# Patient Record
Sex: FEMALE | Race: WHITE
[De-identification: names, ages, dates, MRNs, and addresses within clinical notes are randomized per-mention and may not be internally consistent; named-entity substitution may affect disease eponyms.]

---

## 2017-01-09 ENCOUNTER — APPOINTMENT (OUTPATIENT)
Dept: FAMILY MEDICINE | Facility: CLINIC | Age: 52
End: 2017-01-09

## 2017-01-09 VITALS
TEMPERATURE: 97.7 F | WEIGHT: 186 LBS | DIASTOLIC BLOOD PRESSURE: 82 MMHG | HEIGHT: 65 IN | BODY MASS INDEX: 30.99 KG/M2 | OXYGEN SATURATION: 96 % | SYSTOLIC BLOOD PRESSURE: 120 MMHG | HEART RATE: 73 BPM

## 2017-03-02 ENCOUNTER — RX RENEWAL (OUTPATIENT)
Age: 52
End: 2017-03-02

## 2017-03-30 ENCOUNTER — RX RENEWAL (OUTPATIENT)
Age: 52
End: 2017-03-30

## 2017-05-02 ENCOUNTER — APPOINTMENT (OUTPATIENT)
Dept: FAMILY MEDICINE | Facility: CLINIC | Age: 52
End: 2017-05-02

## 2017-05-03 ENCOUNTER — APPOINTMENT (OUTPATIENT)
Dept: FAMILY MEDICINE | Facility: CLINIC | Age: 52
End: 2017-05-03

## 2017-05-03 ENCOUNTER — NON-APPOINTMENT (OUTPATIENT)
Age: 52
End: 2017-05-03

## 2017-05-03 VITALS
BODY MASS INDEX: 30.32 KG/M2 | HEIGHT: 65 IN | HEART RATE: 80 BPM | WEIGHT: 182 LBS | SYSTOLIC BLOOD PRESSURE: 110 MMHG | DIASTOLIC BLOOD PRESSURE: 78 MMHG

## 2017-05-03 DIAGNOSIS — J06.9 ACUTE UPPER RESPIRATORY INFECTION, UNSPECIFIED: ICD-10-CM

## 2017-05-03 DIAGNOSIS — Z00.00 ENCOUNTER FOR GENERAL ADULT MEDICAL EXAMINATION W/OUT ABNORMAL FINDINGS: ICD-10-CM

## 2017-05-03 DIAGNOSIS — Z23 ENCOUNTER FOR IMMUNIZATION: ICD-10-CM

## 2017-05-17 LAB
25(OH)D3 SERPL-MCNC: 18.1 NG/ML
ALBUMIN SERPL ELPH-MCNC: 4.3 G/DL
ALP BLD-CCNC: 62 U/L
ALT SERPL-CCNC: 15 U/L
ANION GAP SERPL CALC-SCNC: 13 MMOL/L
APPEARANCE: CLEAR
AST SERPL-CCNC: 18 U/L
BASOPHILS # BLD AUTO: 0.05 K/UL
BASOPHILS NFR BLD AUTO: 0.8 %
BILIRUB SERPL-MCNC: 0.6 MG/DL
BILIRUBIN URINE: NEGATIVE
BLOOD URINE: NEGATIVE
BUN SERPL-MCNC: 11 MG/DL
CALCIUM SERPL-MCNC: 9.8 MG/DL
CHLORIDE SERPL-SCNC: 105 MMOL/L
CHOLEST SERPL-MCNC: 195 MG/DL
CHOLEST/HDLC SERPL: 2.4 RATIO
CO2 SERPL-SCNC: 26 MMOL/L
COLOR: YELLOW
CREAT SERPL-MCNC: 1.04 MG/DL
EOSINOPHIL # BLD AUTO: 0.44 K/UL
EOSINOPHIL NFR BLD AUTO: 6.9 %
GLUCOSE QUALITATIVE U: NORMAL MG/DL
GLUCOSE SERPL-MCNC: 93 MG/DL
HCT VFR BLD CALC: 41.7 %
HDLC SERPL-MCNC: 81 MG/DL
HGB BLD-MCNC: 13.8 G/DL
IMM GRANULOCYTES NFR BLD AUTO: 0.2 %
KETONES URINE: NEGATIVE
LDLC SERPL CALC-MCNC: 102 MG/DL
LEUKOCYTE ESTERASE URINE: NEGATIVE
LYMPHOCYTES # BLD AUTO: 1.77 K/UL
LYMPHOCYTES NFR BLD AUTO: 27.6 %
MAN DIFF?: NORMAL
MCHC RBC-ENTMCNC: 30.3 PG
MCHC RBC-ENTMCNC: 33.1 GM/DL
MCV RBC AUTO: 91.4 FL
MONOCYTES # BLD AUTO: 0.59 K/UL
MONOCYTES NFR BLD AUTO: 9.2 %
NEUTROPHILS # BLD AUTO: 3.56 K/UL
NEUTROPHILS NFR BLD AUTO: 55.3 %
NITRITE URINE: NEGATIVE
PH URINE: 5.5
PLATELET # BLD AUTO: 276 K/UL
POTASSIUM SERPL-SCNC: 4.5 MMOL/L
PROT SERPL-MCNC: 7.4 G/DL
PROTEIN URINE: NEGATIVE MG/DL
RBC # BLD: 4.56 M/UL
RBC # FLD: 13.7 %
SODIUM SERPL-SCNC: 144 MMOL/L
SPECIFIC GRAVITY URINE: 1.02
T4 FREE SERPL-MCNC: 1.4 NG/DL
TRIGL SERPL-MCNC: 58 MG/DL
TSH SERPL-ACNC: 1.29 UIU/ML
UROBILINOGEN URINE: NORMAL MG/DL
WBC # FLD AUTO: 6.42 K/UL

## 2017-05-19 ENCOUNTER — APPOINTMENT (OUTPATIENT)
Dept: FAMILY MEDICINE | Facility: CLINIC | Age: 52
End: 2017-05-19

## 2017-05-23 ENCOUNTER — APPOINTMENT (OUTPATIENT)
Dept: FAMILY MEDICINE | Facility: CLINIC | Age: 52
End: 2017-05-23

## 2017-05-23 VITALS
SYSTOLIC BLOOD PRESSURE: 116 MMHG | TEMPERATURE: 98 F | DIASTOLIC BLOOD PRESSURE: 70 MMHG | HEART RATE: 72 BPM | HEIGHT: 65 IN | BODY MASS INDEX: 30.66 KG/M2 | WEIGHT: 184 LBS

## 2017-06-01 ENCOUNTER — RX RENEWAL (OUTPATIENT)
Age: 52
End: 2017-06-01

## 2017-07-14 ENCOUNTER — RX RENEWAL (OUTPATIENT)
Age: 52
End: 2017-07-14

## 2017-07-26 ENCOUNTER — RX RENEWAL (OUTPATIENT)
Age: 52
End: 2017-07-26

## 2017-09-05 ENCOUNTER — RX RENEWAL (OUTPATIENT)
Age: 52
End: 2017-09-05

## 2017-09-05 ENCOUNTER — OTHER (OUTPATIENT)
Age: 52
End: 2017-09-05

## 2017-09-05 LAB — 25(OH)D3 SERPL-MCNC: 26.1 NG/ML

## 2017-09-05 RX ORDER — ERGOCALCIFEROL 1.25 MG/1
1.25 MG CAPSULE, LIQUID FILLED ORAL
Qty: 8 | Refills: 0 | Status: DISCONTINUED | COMMUNITY
Start: 2017-05-23 | End: 2017-09-05

## 2017-10-03 ENCOUNTER — RX RENEWAL (OUTPATIENT)
Age: 52
End: 2017-10-03

## 2017-10-31 ENCOUNTER — APPOINTMENT (OUTPATIENT)
Dept: DERMATOLOGY | Facility: CLINIC | Age: 52
End: 2017-10-31
Payer: MEDICARE

## 2017-10-31 PROCEDURE — 17000 DESTRUCT PREMALG LESION: CPT

## 2017-10-31 PROCEDURE — 99214 OFFICE O/P EST MOD 30 MIN: CPT | Mod: 25

## 2017-11-10 ENCOUNTER — APPOINTMENT (OUTPATIENT)
Dept: OBGYN | Facility: CLINIC | Age: 52
End: 2017-11-10
Payer: MEDICARE

## 2017-11-10 VITALS
BODY MASS INDEX: 29.82 KG/M2 | DIASTOLIC BLOOD PRESSURE: 80 MMHG | HEIGHT: 65 IN | WEIGHT: 179 LBS | SYSTOLIC BLOOD PRESSURE: 122 MMHG

## 2017-11-10 DIAGNOSIS — Z01.419 ENCOUNTER FOR GYNECOLOGICAL EXAMINATION (GENERAL) (ROUTINE) W/OUT ABNORMAL FINDINGS: ICD-10-CM

## 2017-11-10 PROCEDURE — 82270 OCCULT BLOOD FECES: CPT

## 2017-11-10 PROCEDURE — G0101: CPT

## 2017-11-12 LAB — HPV HIGH+LOW RISK DNA PNL CVX: NOT DETECTED

## 2017-11-14 LAB — CYTOLOGY CVX/VAG DOC THIN PREP: NORMAL

## 2017-12-05 ENCOUNTER — RX RENEWAL (OUTPATIENT)
Age: 52
End: 2017-12-05

## 2017-12-13 ENCOUNTER — MEDICATION RENEWAL (OUTPATIENT)
Age: 52
End: 2017-12-13

## 2017-12-21 ENCOUNTER — RX RENEWAL (OUTPATIENT)
Age: 52
End: 2017-12-21

## 2018-01-01 ENCOUNTER — FORM ENCOUNTER (OUTPATIENT)
Age: 53
End: 2018-01-01

## 2018-01-02 ENCOUNTER — OUTPATIENT (OUTPATIENT)
Dept: OUTPATIENT SERVICES | Facility: HOSPITAL | Age: 53
LOS: 1 days | End: 2018-01-02
Payer: MEDICARE

## 2018-01-02 ENCOUNTER — APPOINTMENT (OUTPATIENT)
Dept: MAMMOGRAPHY | Facility: CLINIC | Age: 53
End: 2018-01-02
Payer: MEDICARE

## 2018-01-02 DIAGNOSIS — Z01.419 ENCOUNTER FOR GYNECOLOGICAL EXAMINATION (GENERAL) (ROUTINE) WITHOUT ABNORMAL FINDINGS: ICD-10-CM

## 2018-01-02 PROCEDURE — 77063 BREAST TOMOSYNTHESIS BI: CPT

## 2018-01-02 PROCEDURE — 77063 BREAST TOMOSYNTHESIS BI: CPT | Mod: 26

## 2018-01-02 PROCEDURE — 77067 SCR MAMMO BI INCL CAD: CPT

## 2018-01-02 PROCEDURE — 77067 SCR MAMMO BI INCL CAD: CPT | Mod: 26

## 2018-01-09 ENCOUNTER — APPOINTMENT (OUTPATIENT)
Dept: FAMILY MEDICINE | Facility: CLINIC | Age: 53
End: 2018-01-09
Payer: MEDICARE

## 2018-01-09 VITALS
WEIGHT: 191 LBS | SYSTOLIC BLOOD PRESSURE: 120 MMHG | BODY MASS INDEX: 31.82 KG/M2 | DIASTOLIC BLOOD PRESSURE: 80 MMHG | HEART RATE: 80 BPM | HEIGHT: 65 IN

## 2018-01-09 PROCEDURE — 99214 OFFICE O/P EST MOD 30 MIN: CPT

## 2018-01-09 RX ORDER — UBIDECARENONE/VIT E ACET 100MG-5
50 MCG CAPSULE ORAL
Refills: 0 | Status: ACTIVE | COMMUNITY

## 2018-02-01 ENCOUNTER — RX RENEWAL (OUTPATIENT)
Age: 53
End: 2018-02-01

## 2018-02-22 ENCOUNTER — APPOINTMENT (OUTPATIENT)
Dept: FAMILY MEDICINE | Facility: CLINIC | Age: 53
End: 2018-02-22
Payer: MEDICARE

## 2018-02-26 ENCOUNTER — APPOINTMENT (OUTPATIENT)
Dept: FAMILY MEDICINE | Facility: CLINIC | Age: 53
End: 2018-02-26
Payer: MEDICARE

## 2018-02-26 VITALS
HEIGHT: 65 IN | DIASTOLIC BLOOD PRESSURE: 60 MMHG | BODY MASS INDEX: 31.65 KG/M2 | TEMPERATURE: 98.7 F | SYSTOLIC BLOOD PRESSURE: 120 MMHG | WEIGHT: 190 LBS | HEART RATE: 72 BPM

## 2018-02-26 DIAGNOSIS — J06.9 ACUTE UPPER RESPIRATORY INFECTION, UNSPECIFIED: ICD-10-CM

## 2018-02-26 PROCEDURE — 99214 OFFICE O/P EST MOD 30 MIN: CPT

## 2018-02-27 ENCOUNTER — MEDICATION RENEWAL (OUTPATIENT)
Age: 53
End: 2018-02-27

## 2018-03-16 ENCOUNTER — APPOINTMENT (OUTPATIENT)
Dept: DERMATOLOGY | Facility: CLINIC | Age: 53
End: 2018-03-16
Payer: MEDICARE

## 2018-03-16 PROCEDURE — 99213 OFFICE O/P EST LOW 20 MIN: CPT

## 2018-04-24 ENCOUNTER — RX RENEWAL (OUTPATIENT)
Age: 53
End: 2018-04-24

## 2018-06-08 ENCOUNTER — APPOINTMENT (OUTPATIENT)
Dept: FAMILY MEDICINE | Facility: CLINIC | Age: 53
End: 2018-06-08

## 2018-06-11 ENCOUNTER — RX RENEWAL (OUTPATIENT)
Age: 53
End: 2018-06-11

## 2018-06-25 LAB — 25(OH)D3 SERPL-MCNC: 31.9 NG/ML

## 2018-06-28 ENCOUNTER — RX RENEWAL (OUTPATIENT)
Age: 53
End: 2018-06-28

## 2018-07-06 ENCOUNTER — APPOINTMENT (OUTPATIENT)
Dept: FAMILY MEDICINE | Facility: CLINIC | Age: 53
End: 2018-07-06
Payer: MEDICARE

## 2018-07-06 VITALS
HEIGHT: 65 IN | HEART RATE: 76 BPM | BODY MASS INDEX: 31.65 KG/M2 | DIASTOLIC BLOOD PRESSURE: 70 MMHG | SYSTOLIC BLOOD PRESSURE: 110 MMHG | WEIGHT: 190 LBS

## 2018-07-06 PROCEDURE — 90715 TDAP VACCINE 7 YRS/> IM: CPT | Mod: GY

## 2018-07-06 PROCEDURE — 90471 IMMUNIZATION ADMIN: CPT | Mod: GY

## 2018-07-06 PROCEDURE — 99213 OFFICE O/P EST LOW 20 MIN: CPT | Mod: 25

## 2018-07-06 NOTE — HISTORY OF PRESENT ILLNESS
[FreeTextEntry8] : PRESENTING FOR ACUTE VISIT.  SCRATCHED BY HER OWN CAT ON LEFT THUMB.  HAPPENED APPROXIMATELY ONE WEEK AGO.  NO REDNESS.  NO FEVER.  PUT ON PEROXIDE.  A LITTLE SORE.  NO SIGNIFICANT PAIN.  KEEPING AREA CLEAN.  NO BLEEDING.  NOT WORSENING.  OTHERWISE FEELING WELL.  IS DUE FOR TDAP.  NO PRIOR ADVERSE REACTIONS TO VACCINATIONS.

## 2018-07-06 NOTE — REVIEW OF SYSTEMS
[Fever] : no fever [Fatigue] : no fatigue [Chest Pain] : no chest pain [Palpitations] : no palpitations [Shortness Of Breath] : no shortness of breath [Cough] : no cough [Itching] : no itching [Skin Rash] : no skin rash [de-identified] : SEE HPI

## 2018-07-06 NOTE — PLAN
[FreeTextEntry1] : KEEP CLEAN AND DRY\par AVOIDANCE OF PEROXIDE\par BACITRACIN\par PREVENTION\par TDAP GIVEN AND TOLERATED WELL BY [employer]\par TO CALL IF ANY S/S INFECTION\par FOLLOW-UP FOR ROUTINE CARE AND RECHECK NEXT WEEK\par CALL WITH ANY QUESTIONS, CONCERNS OR CHANGES

## 2018-07-06 NOTE — PHYSICAL EXAM
[No Acute Distress] : no acute distress [Well Nourished] : well nourished [Well-Appearing] : well-appearing [No Respiratory Distress] : no respiratory distress  [Clear to Auscultation] : lungs were clear to auscultation bilaterally [No Accessory Muscle Use] : no accessory muscle use [Normal Rate] : normal rate  [Regular Rhythm] : with a regular rhythm [Normal S1, S2] : normal S1 and S2 [No Murmur] : no murmur heard [de-identified] : RIGHT PAD OF THUMB WITH 2 MM SUPERFICIAL SCRATCH HEALING WELL WITHOUT ERYTHEMA OR DISCHARGE.  WHITE SURROUNDING AREA LIKELY FROM PEROXIDE USE.  NON-TENDER.  MOVING WELL.

## 2018-07-13 ENCOUNTER — APPOINTMENT (OUTPATIENT)
Dept: FAMILY MEDICINE | Facility: CLINIC | Age: 53
End: 2018-07-13
Payer: MEDICARE

## 2018-07-13 ENCOUNTER — LABORATORY RESULT (OUTPATIENT)
Age: 53
End: 2018-07-13

## 2018-07-13 VITALS
DIASTOLIC BLOOD PRESSURE: 70 MMHG | SYSTOLIC BLOOD PRESSURE: 110 MMHG | TEMPERATURE: 98 F | HEART RATE: 83 BPM | WEIGHT: 188 LBS | HEIGHT: 65 IN | OXYGEN SATURATION: 97 % | BODY MASS INDEX: 31.32 KG/M2

## 2018-07-13 PROCEDURE — 36415 COLL VENOUS BLD VENIPUNCTURE: CPT

## 2018-07-13 PROCEDURE — 99213 OFFICE O/P EST LOW 20 MIN: CPT | Mod: 25

## 2018-07-13 NOTE — PLAN
[FreeTextEntry1] : ASTHMA ACTION PLAN REVIEWED\par PRESCRIPTIONS ROUTINELY SENT\par CHECK LAB WORK\par FOLLOW-UP ALL SPECIALISTS AS DIRECTED \par CALL WITH ANY QUESTIONS, CONCERNS OR CHANGES \par FLU VACCINE IN THE FALL RECOMMENDED

## 2018-07-13 NOTE — PHYSICAL EXAM
[No Acute Distress] : no acute distress [Well Nourished] : well nourished [Well-Appearing] : well-appearing [Supple] : supple [No Lymphadenopathy] : no lymphadenopathy [No Respiratory Distress] : no respiratory distress  [Clear to Auscultation] : lungs were clear to auscultation bilaterally [No Accessory Muscle Use] : no accessory muscle use [Normal Rate] : normal rate  [Regular Rhythm] : with a regular rhythm [Normal S1, S2] : normal S1 and S2 [No Murmur] : no murmur heard

## 2018-07-13 NOTE — HISTORY OF PRESENT ILLNESS
[FreeTextEntry1] : F/U ASTHMA [de-identified] : PRESENTING FOR FOLLOW-UP.  CHRONIC HISTORY OF ASTHMA.  ON ADVAIR AND SINGULAIR DAILY.  WILL USE NASAL SPRAY FOR ALLERGIC RHINITIS AS NEEDED.  HAS RESCUE INHALER.  LAST USED THIS MORNING.  WILL USE ONCE OR TWICE A WEEK AT MOST.  NOT DAILY.  SEES HER ALLERGIST ROUTINELY.  TAKING VITAMIN D SUPPLEMENTS FOR VITAMIN D INSUFFICIENCY.  THYROID UNDER THE CARE OF ENDOCRINOLOGY DR. BAILEY.  FINGER SYMPTOMS RESOLVED FROM LAST VISIT.

## 2018-07-13 NOTE — REVIEW OF SYSTEMS
[Fever] : no fever [Fatigue] : no fatigue [Chest Pain] : no chest pain [Palpitations] : no palpitations [Shortness Of Breath] : no shortness of breath [Wheezing] : no wheezing [Cough] : no cough [Abdominal Pain] : no abdominal pain [Nausea] : no nausea [Diarrhea] : diarrhea [Vomiting] : no vomiting

## 2018-07-16 ENCOUNTER — RECORD ABSTRACTING (OUTPATIENT)
Age: 53
End: 2018-07-16

## 2018-07-19 LAB
ALBUMIN SERPL ELPH-MCNC: 4.4 G/DL
ALP BLD-CCNC: 76 U/L
ALT SERPL-CCNC: 24 U/L
ANION GAP SERPL CALC-SCNC: 12 MMOL/L
APPEARANCE: CLEAR
AST SERPL-CCNC: 23 U/L
BILIRUB SERPL-MCNC: 0.4 MG/DL
BILIRUBIN URINE: ABNORMAL
BLOOD URINE: NEGATIVE
BUN SERPL-MCNC: 11 MG/DL
CALCIUM SERPL-MCNC: 9.6 MG/DL
CHLORIDE SERPL-SCNC: 106 MMOL/L
CHOLEST SERPL-MCNC: 205 MG/DL
CHOLEST/HDLC SERPL: 2.7 RATIO
CO2 SERPL-SCNC: 25 MMOL/L
COLOR: ABNORMAL
CREAT SERPL-MCNC: 0.8 MG/DL
GLUCOSE QUALITATIVE U: NEGATIVE MG/DL
GLUCOSE SERPL-MCNC: 105 MG/DL
HBA1C MFR BLD HPLC: 5.5 %
HDLC SERPL-MCNC: 75 MG/DL
KETONES URINE: NEGATIVE
LDLC SERPL CALC-MCNC: 113 MG/DL
LEUKOCYTE ESTERASE URINE: NEGATIVE
NITRITE URINE: NEGATIVE
PH URINE: 5
POTASSIUM SERPL-SCNC: 4.2 MMOL/L
PROT SERPL-MCNC: 7.2 G/DL
PROTEIN URINE: ABNORMAL MG/DL
SODIUM SERPL-SCNC: 143 MMOL/L
SPECIFIC GRAVITY URINE: 1.02
TRIGL SERPL-MCNC: 83 MG/DL
UROBILINOGEN URINE: 1 MG/DL

## 2018-07-20 ENCOUNTER — RESULT REVIEW (OUTPATIENT)
Age: 53
End: 2018-07-20

## 2018-07-20 LAB
BASOPHILS # BLD AUTO: 0.07 K/UL
BASOPHILS NFR BLD AUTO: 1 %
EOSINOPHIL # BLD AUTO: 0.46 K/UL
EOSINOPHIL NFR BLD AUTO: 6.8 %
HCT VFR BLD CALC: 42.7 %
HGB BLD-MCNC: 14.2 G/DL
IMM GRANULOCYTES NFR BLD AUTO: 0.1 %
LYMPHOCYTES # BLD AUTO: 1.52 K/UL
LYMPHOCYTES NFR BLD AUTO: 22.4 %
MAN DIFF?: NORMAL
MCHC RBC-ENTMCNC: 30.2 PG
MCHC RBC-ENTMCNC: 33.3 GM/DL
MCV RBC AUTO: 90.9 FL
MONOCYTES # BLD AUTO: 0.51 K/UL
MONOCYTES NFR BLD AUTO: 7.5 %
NEUTROPHILS # BLD AUTO: 4.21 K/UL
NEUTROPHILS NFR BLD AUTO: 62.2 %
PLATELET # BLD AUTO: 289 K/UL
RBC # BLD: 4.7 M/UL
RBC # FLD: 14.1 %
WBC # FLD AUTO: 6.78 K/UL

## 2018-09-20 ENCOUNTER — OTHER (OUTPATIENT)
Age: 53
End: 2018-09-20

## 2018-09-20 ENCOUNTER — APPOINTMENT (OUTPATIENT)
Dept: FAMILY MEDICINE | Facility: CLINIC | Age: 53
End: 2018-09-20
Payer: MEDICARE

## 2018-09-20 VITALS — TEMPERATURE: 97.9 F

## 2018-09-20 PROCEDURE — 90686 IIV4 VACC NO PRSV 0.5 ML IM: CPT

## 2018-09-20 PROCEDURE — G0008: CPT

## 2018-10-02 LAB — HEMOCCULT STL QL IA: NEGATIVE

## 2018-10-23 ENCOUNTER — NON-APPOINTMENT (OUTPATIENT)
Age: 53
End: 2018-10-23

## 2018-10-23 ENCOUNTER — APPOINTMENT (OUTPATIENT)
Dept: FAMILY MEDICINE | Facility: CLINIC | Age: 53
End: 2018-10-23
Payer: MEDICARE

## 2018-10-23 VITALS
WEIGHT: 188 LBS | BODY MASS INDEX: 31.32 KG/M2 | HEIGHT: 65 IN | DIASTOLIC BLOOD PRESSURE: 82 MMHG | SYSTOLIC BLOOD PRESSURE: 120 MMHG | HEART RATE: 84 BPM

## 2018-10-23 DIAGNOSIS — Z87.2 PERSONAL HISTORY OF DISEASES OF THE SKIN AND SUBCUTANEOUS TISSUE: ICD-10-CM

## 2018-10-23 DIAGNOSIS — Z23 ENCOUNTER FOR IMMUNIZATION: ICD-10-CM

## 2018-10-23 DIAGNOSIS — M25.561 PAIN IN RIGHT KNEE: ICD-10-CM
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
## 2018-10-23 DIAGNOSIS — Z92.29 PERSONAL HISTORY OF OTHER DRUG THERAPY: ICD-10-CM

## 2018-10-23 PROCEDURE — 93000 ELECTROCARDIOGRAM COMPLETE: CPT | Mod: 59

## 2018-10-23 PROCEDURE — G0439: CPT

## 2018-10-23 NOTE — REVIEW OF SYSTEMS
[Fever] : no fever [Chills] : no chills [Fatigue] : no fatigue [Discharge] : no discharge [Pain] : no pain [Earache] : no earache [Nasal Discharge] : no nasal discharge [Sore Throat] : no sore throat [Chest Pain] : no chest pain [Palpitations] : no palpitations [Shortness Of Breath] : no shortness of breath [Wheezing] : no wheezing [Cough] : no cough [Abdominal Pain] : no abdominal pain [Diarrhea] : diarrhea [Vomiting] : no vomiting [Dysuria] : no dysuria [Hematuria] : no hematuria [Joint Pain] : no joint pain [Muscle Pain] : no muscle pain [Itching] : no itching [Skin Rash] : no skin rash [Headache] : no headache [Dizziness] : no dizziness [Fainting] : no fainting [Suicidal] : not suicidal [Depression] : no depression [Easy Bleeding] : no easy bleeding [Easy Bruising] : no easy bruising

## 2018-10-23 NOTE — HEALTH RISK ASSESSMENT
[Good] : ~his/her~  mood as  good [One fall no injury in past year] : Patient reported one fall in the past year without injury [0] : 2) Feeling down, depressed, or hopeless: Not at all (0) [Patient reported mammogram was normal] : Patient reported mammogram was normal [Patient reported PAP Smear was normal] : Patient reported PAP Smear was normal [Patient reported colonoscopy was normal] : Patient reported colonoscopy was normal [HIV test declined] : HIV test declined [Hepatitis C test offered] : Hepatitis C test offered [Learning/Retaining New Information] : difficulty learning/retaining new information [Handling Complex Tasks] : difficulty handling complex tasks [None] : None [With Significant Other] : lives with significant other [On disability] : on disability [Single] : single [# Of Children ___] : has [unfilled] children [Feels Safe at Home] : Feels safe at home [Fully functional (bathing, dressing, toileting, transferring, walking, feeding)] : Fully functional (bathing, dressing, toileting, transferring, walking, feeding) [Smoke Detector] : smoke detector [Carbon Monoxide Detector] : carbon monoxide detector [Safety elements used in home] : safety elements used in home [Seat Belt] :  uses seat belt [Sunscreen] : uses sunscreen [Discussed at today's visit] : Advance Directives Discussed at today's visit [] : No [[id number]] : 0 [Change in mental status noted] : No change in mental status noted [Language] : denies difficulty with language [Behavior] : denies difficulty with behavior [Spatial Ability and Orientation] : denies difficulty with spatial ability and orientation [High Risk Behavior] : no high risk behavior [Reports changes in hearing] : Reports no changes in hearing [Reports changes in dental health] : Reports no changes in dental health [MammogramDate] : 01/18 [PapSmearDate] : 11/17 [PapSmearComments] : DR. BARLOW [ColonoscopyDate] : 08/16 [ColonoscopyComments] : FIVE YEAR FOLLOW-UP ADVISED [de-identified] : [employer] [de-identified] : SOME ASSISTANCE WITH HOUSEKEEPING.  HAS HELP WITH MANAGING FINANCES [de-identified] : GLASSES FOR DISTANCE AND READING [de-identified] : SEES DENTIST ROUTINELY

## 2018-10-23 NOTE — PLAN
[FreeTextEntry1] : HEALTHY DIET AND LIFESTYLE MODIFICATIONS\par HEALTHY WEIGHT LOSS \par EKG: NSR AT 65 BPM, NO ACUTE ST-T WAVE CHANGES; NO SIGNIFICANT CHANGE FROM PRIOR\par ROUTINE GYN EXAMS\par FOLLOW-UP ALL SPECIALISTS AS DIRECTED \par VISION SCREENING DECLINED\par MOST RECENT LAB WORK REVIEWED\par CALL WITH ANY QUESTIONS, CONCERNS OR CHANGES

## 2018-10-23 NOTE — HISTORY OF PRESENT ILLNESS
[FreeTextEntry1] : wellness exam [de-identified] : PRESENTING FOR YEARLY WELLNESS EXAM.  FEELING WELL TODAY.  CHRONIC HISTORY OF ASTHMA. ON ADVAIR AND SINGULAIR DAILY.  DOES NOT USE RESCUE INHALER OFTEN.  MAYBE ONCE A WEEK.  WILL USE NASAL SPRAY FOR ALLERGIC RHINITIS AS NEEDED. SEES HER ALLERGIST ROUTINELY. TAKING VITAMIN D SUPPLEMENTS FOR VITAMIN D INSUFFICIENCY. THYROID UNDER THE CARE OF ENDOCRINOLOGY DR. BAILEY ON LEVOTHYROXINE.  UP TO DATE WITH GYN, MAMMOGRAM AND COLONOSCOPY.  SEES EYE DOCTOR ROUTINELY.  DIET "NOT BAD" BUT COULD BE IMPROVED.  WAS SEEING NUTRITIONIST.  WALKING DAILY.   HAS HAD NO HEADACHE, DIZZINESS, CHEST PAIN, SHORTNESS OF BREATH, GI OR URINARY COMPLAINTS.  NO OTHER COMPLAINTS TODAY. \par \par SPECIALISTS:\par ENDOCRINOLOGY: DR. BAILEY\par ALLERGIST: DR. SILVA\par OPHTHALMOLOGY: DR. PATEL\par GYN: DR. BARLOW\par GI: DR. MERCADO\par DERMATOLOGY: DR. BARBOSA - UPCOMING APPOINTMENT FOR YEARLY SCREENING

## 2018-10-30 ENCOUNTER — APPOINTMENT (OUTPATIENT)
Dept: DERMATOLOGY | Facility: CLINIC | Age: 53
End: 2018-10-30
Payer: MEDICARE

## 2018-10-30 PROCEDURE — 99214 OFFICE O/P EST MOD 30 MIN: CPT

## 2018-11-20 ENCOUNTER — APPOINTMENT (OUTPATIENT)
Dept: DERMATOLOGY | Facility: CLINIC | Age: 53
End: 2018-11-20
Payer: MEDICARE

## 2018-11-20 PROCEDURE — 17110 DESTRUCTION B9 LES UP TO 14: CPT

## 2018-11-20 PROCEDURE — 99213 OFFICE O/P EST LOW 20 MIN: CPT | Mod: 25

## 2018-12-20 ENCOUNTER — RX RENEWAL (OUTPATIENT)
Age: 53
End: 2018-12-20

## 2018-12-20 ENCOUNTER — MEDICATION RENEWAL (OUTPATIENT)
Age: 53
End: 2018-12-20

## 2018-12-28 ENCOUNTER — APPOINTMENT (OUTPATIENT)
Dept: DERMATOLOGY | Facility: CLINIC | Age: 53
End: 2018-12-28
Payer: MEDICARE

## 2018-12-28 PROCEDURE — 99213 OFFICE O/P EST LOW 20 MIN: CPT

## 2019-01-08 ENCOUNTER — APPOINTMENT (OUTPATIENT)
Dept: DERMATOLOGY | Facility: CLINIC | Age: 54
End: 2019-01-08
Payer: MEDICARE

## 2019-01-08 ENCOUNTER — APPOINTMENT (OUTPATIENT)
Dept: FAMILY MEDICINE | Facility: CLINIC | Age: 54
End: 2019-01-08
Payer: MEDICARE

## 2019-01-08 VITALS
SYSTOLIC BLOOD PRESSURE: 122 MMHG | HEIGHT: 65 IN | HEART RATE: 80 BPM | WEIGHT: 186 LBS | BODY MASS INDEX: 30.99 KG/M2 | DIASTOLIC BLOOD PRESSURE: 70 MMHG

## 2019-01-08 PROCEDURE — 36415 COLL VENOUS BLD VENIPUNCTURE: CPT

## 2019-01-08 PROCEDURE — 99212 OFFICE O/P EST SF 10 MIN: CPT

## 2019-01-08 PROCEDURE — 99214 OFFICE O/P EST MOD 30 MIN: CPT | Mod: 25

## 2019-01-08 NOTE — HISTORY OF PRESENT ILLNESS
[FreeTextEntry1] : F/U ASTHMA [de-identified] : PATIENT PRESENTS FOR FOLLOW UP. REPORTS RESOLVING INFECTION TO LEFT SHIN WHICH WAS TREATED BY DERMATOLOGY AND HAS A FOLLOW-UP APPOINTMENT AGAIN TODAY.  CHRONIC HISTORY OF ASTHMA. ON ADVAIR AND SINGULAIR DAILY. DOES NOT USE RESCUE INHALER OFTEN. MAYBE ONCE A WEEK. IS NOT TAKING VITAMIN D SUPPLEMENTS FOR VITAMIN D INSUFFICIENCY. THYROID UNDER THE CARE OF ENDOCRINOLOGY DR. BAILEY ON LEVOTHYROXINE. PLANS TO CHANGE GYN. NOW SEEING DR. HERNANDEZ  [address], SCHEDULED TO GET MAMMOGRAM THIS MONTH.  HISTORY OF OBESITY.  WALKS REGULARLY. WOULD LIKE TO START GOING TO THE GYM.  [employer]I HELPING MAKING LIFESTYLE MODIFICATIONS AND MAKING BETTER FOOD CHOICES. HAS HAD NO HEADACHE, DIZZINESS, CHEST PAIN, SHORTNESS OF BREATH, GI OR URINARY COMPLAINTS. NO OTHER COMPLAINTS TODAY.

## 2019-01-08 NOTE — PHYSICAL EXAM
[No Acute Distress] : no acute distress [Well Nourished] : well nourished [Well-Appearing] : well-appearing [Normal Outer Ear/Nose] : the outer ears and nose were normal in appearance [Normal Oropharynx] : the oropharynx was normal [No JVD] : no jugular venous distention [Supple] : supple [No Lymphadenopathy] : no lymphadenopathy [No Respiratory Distress] : no respiratory distress  [Clear to Auscultation] : lungs were clear to auscultation bilaterally [No Accessory Muscle Use] : no accessory muscle use [Normal Rate] : normal rate  [Regular Rhythm] : with a regular rhythm [Normal S1, S2] : normal S1 and S2 [No Murmur] : no murmur heard [Normal Gait] : normal gait [Coordination Grossly Intact] : coordination grossly intact [Speech Grossly Normal] : speech grossly normal [Normal Affect] : the affect was normal [Normal Mood] : the mood was normal

## 2019-01-08 NOTE — ADDENDUM
[FreeTextEntry1] : I, Paula Rivero, acted solely as a scribe for Dr. Marcelina Diana on this date 1/8/19

## 2019-01-08 NOTE — ASSESSMENT
[FreeTextEntry1] : ASTHMA ACTION PLAN REVIEWED\par RESTART VITAMIN D SUPPLEMENTS\par WILL MONITOR VITAMIN D LEVELS \par WILL OBTAIN MAMMOGRAM RESULTS ONCE COMPLETED\par HEALTHY DIET AND LIFESTYLE MODIFICATIONS\par HEALTHY WEIGHT LOSS\par FOLLOW-UP ALL SPECIALISTS AS DIRECTED\par CALL WITH ANY QUESTIONS, COMMENTS, OR CONCERNS.

## 2019-01-18 ENCOUNTER — RESULT REVIEW (OUTPATIENT)
Age: 54
End: 2019-01-18

## 2019-01-18 LAB — 25(OH)D3 SERPL-MCNC: 25.5 NG/ML

## 2019-03-29 ENCOUNTER — RX RENEWAL (OUTPATIENT)
Age: 54
End: 2019-03-29

## 2019-06-25 ENCOUNTER — APPOINTMENT (OUTPATIENT)
Dept: VASCULAR SURGERY | Facility: CLINIC | Age: 54
End: 2019-06-25

## 2019-07-11 ENCOUNTER — APPOINTMENT (OUTPATIENT)
Dept: FAMILY MEDICINE | Facility: CLINIC | Age: 54
End: 2019-07-11
Payer: MEDICARE

## 2019-07-11 ENCOUNTER — APPOINTMENT (OUTPATIENT)
Dept: VASCULAR SURGERY | Facility: CLINIC | Age: 54
End: 2019-07-11
Payer: MEDICARE

## 2019-07-11 VITALS
HEIGHT: 65 IN | SYSTOLIC BLOOD PRESSURE: 132 MMHG | DIASTOLIC BLOOD PRESSURE: 86 MMHG | WEIGHT: 185 LBS | BODY MASS INDEX: 30.82 KG/M2 | HEART RATE: 76 BPM

## 2019-07-11 VITALS
HEIGHT: 65 IN | HEART RATE: 75 BPM | SYSTOLIC BLOOD PRESSURE: 130 MMHG | DIASTOLIC BLOOD PRESSURE: 86 MMHG | OXYGEN SATURATION: 99 % | BODY MASS INDEX: 30.99 KG/M2 | WEIGHT: 186 LBS | TEMPERATURE: 98.7 F

## 2019-07-11 PROCEDURE — 99214 OFFICE O/P EST MOD 30 MIN: CPT | Mod: 25

## 2019-07-11 PROCEDURE — 36415 COLL VENOUS BLD VENIPUNCTURE: CPT

## 2019-07-11 PROCEDURE — 99203 OFFICE O/P NEW LOW 30 MIN: CPT

## 2019-07-11 NOTE — HISTORY OF PRESENT ILLNESS
[FreeTextEntry1] : 54 year old female presents for evaluation of increasingly prominent bilateral lower extremity varicose veins. She denies pain, swelling, itchiness or redness over the veins. No previous episodes of bleeding, no leg ulcers. No previous leg ulcers.\par She presents today because is is concerned by the appearance of the varicosities.\par She spends a lot of time sitting in her job. She does not wear compression stockings

## 2019-07-11 NOTE — HISTORY OF PRESENT ILLNESS
[FreeTextEntry1] : F/U ASTHMA [de-identified] : MS. WESTFALL IS A PLEASANT 55 YO PRESENTING FOR FOLLOW-UP.  CHRONIC HISTORY OF ASTHMA.  IS NOT USING PROAIR MOST DAYS.  A LITTLE MORE WHEN ALLERGIES ACTING UP.  ON ADVAIR.  ON SINGULAIR.  SEEING ENDOCRINOLOGY DR. BAILEY EVERY THREE MONTHS IN FOLLOW-UP OF THYROID.  SAW VASCULAR TODAY FOR EVALUATION OF SPIDER VEINS.  TO WEAR COMPRESSION STOCKINGS.  IN JANUARY OR FEBRUARY SAW GYN [facility] FOR YEARLY EXAM.   CHRONIC HISTORY OF VITAMIN D INSUFFICIENCY.  TAKING VITAMIN D SUPPLEMENTS.  HAS HAD NO HEADACHE, DIZZINESS, CHEST PAIN, SHORTNESS OF BREATH, GI OR URINARY COMPLAINTS.  OBESITY.  TRYING TO IMPROVE DIET.  REMAINING ACTIVE.  WALKS.  NO OTHER COMPLAINTS TODAY.

## 2019-07-11 NOTE — COUNSELING
[Weight management counseling provided] : Weight management [Healthy eating counseling provided] : healthy eating [Activity counseling provided] : activity [Low Fat Diet] : Low fat diet [Decrease Portions] : Decrease food portions [Walking] : Walking [None] : None

## 2019-07-11 NOTE — PHYSICAL EXAM
[Carotid Bruits] : no carotid bruits [Normal Heart Sounds] : normal heart sounds [Normal Breath Sounds] : Normal breath sounds [2+] : left 2+ [Ankle Swelling (On Exam)] : not present [Varicose Veins Of Lower Extremities] : bilaterally [Ankle Swelling On The Left] : moderate [] : not present [Abdomen Masses] : No abdominal masses [Abdomen Tenderness] : ~T ~M No abdominal tenderness [Alert] : alert [Oriented to Person] : oriented to person [Oriented to Place] : oriented to place [Oriented to Time] : oriented to time [Calm] : calm [de-identified] : Well appearing

## 2019-07-11 NOTE — PLAN
[FreeTextEntry1] : ASTHMA ACTION PLAN\par PRESCRIPTIONS RENEWED\par HAS PFT'S WITH ALLERGIST\par HEALTHY DIET AND LIFESTYLE MODIFICATIONS\par HEALTHY WEIGHT LOSS \par MONITOR VITAMIN D LEVELS\par FOLLOW-UP ALL SPECIALISTS AS DIRECTED \par CALL WITH ANY QUESTIONS, CONCERNS OR CHANGES

## 2019-07-11 NOTE — ASSESSMENT
[FreeTextEntry1] : 54 year old female with asymptomatic bilateral lower extremity varicose veins.\par She has no evidence of arterial insufficiency.\par As patient is minimally symptomatic, I have counselled her on conservative strategies for her varicose veins including compression stockings, exercise and leg elevation.\par I have prescribed knee-high 20-30 mmHg compression stockings.\par I explained to her that if her varicosities become symptomatic, she should return for a reflux study and possible invasive therapy.\par Follow up PRN

## 2019-07-16 ENCOUNTER — OTHER (OUTPATIENT)
Age: 54
End: 2019-07-16

## 2019-07-16 LAB — 25(OH)D3 SERPL-MCNC: 29 NG/ML

## 2019-09-09 ENCOUNTER — RX RENEWAL (OUTPATIENT)
Age: 54
End: 2019-09-09

## 2019-10-08 ENCOUNTER — APPOINTMENT (OUTPATIENT)
Dept: FAMILY MEDICINE | Facility: CLINIC | Age: 54
End: 2019-10-08
Payer: MEDICARE

## 2019-10-08 VITALS
BODY MASS INDEX: 30.99 KG/M2 | WEIGHT: 186 LBS | DIASTOLIC BLOOD PRESSURE: 82 MMHG | HEIGHT: 65 IN | HEART RATE: 70 BPM | SYSTOLIC BLOOD PRESSURE: 122 MMHG

## 2019-10-08 PROCEDURE — 99214 OFFICE O/P EST MOD 30 MIN: CPT

## 2019-10-10 NOTE — HISTORY OF PRESENT ILLNESS
[FreeTextEntry1] : F/U ASTHMA [de-identified] : MS. WESTFALL IS A PLEASANT 53 YO PRESENTING FOR FOLLOW-UP.  CHRONIC HISTORY OF ASTHMA, OBESITY AND VITAMIN D INSUFFICIENCY. IS TAKING VITAMIN D SUPPLEMENTS BUT HAS MISSED SOME DOSES. CHRONIC HISTORY OF ASTHMA. USING INHALERS AS DIRECTED.  ROUTINELY SEEING ALLERGIST.  DIET IS OK WITH SOME ROOM FOR IMPROVEMENT. EATING CONCENTRATED SWEETS. REMAINS ACTIVE BY WALKING EVERY DAY. UP TO DATE WITH MAMMOGRAM AND COLONOSCOPY. HAS HAD NO HEADACHES, DIZZINESS, CHEST PAIN, SHORTNESS OF BREATH, GI OR URINARY COMPLAINTS. NO OTHER COMPLAINTS TODAY. \par

## 2019-10-10 NOTE — REVIEW OF SYSTEMS
[Fever] : no fever [Chills] : no chills [Fatigue] : no fatigue [Chest Pain] : no chest pain [Palpitations] : no palpitations [Lower Ext Edema] : no lower extremity edema [Shortness Of Breath] : no shortness of breath [Wheezing] : no wheezing [Cough] : no cough [Abdominal Pain] : no abdominal pain [Nausea] : no nausea [Diarrhea] : diarrhea [Vomiting] : no vomiting [Dysuria] : no dysuria [Hematuria] : no hematuria [Headache] : no headache [Dizziness] : no dizziness [Fainting] : no fainting [Easy Bleeding] : no easy bleeding [Easy Bruising] : no easy bruising

## 2019-10-10 NOTE — PLAN
[FreeTextEntry1] : CHECK LAB WORK INCLUDING HBA1C AND LIPIDS\par HEALTHY DIET AND LIFESTYLE MODIFICATIONS\par HEALTHY WEIGHT LOSS\par ASTHMA ACTION PLAN REVIEWED\par CONTINUE ALL MEDICATIONS AS DIRECTED\par FOLLOW-UP WITH ALL SPECIALISTS AS DIRECTED\par CALL WITH ANY QUESTIONS, CONCERNS OR CHANGES

## 2019-10-10 NOTE — PHYSICAL EXAM
[No Acute Distress] : no acute distress [Well Nourished] : well nourished [Well-Appearing] : well-appearing [No Lymphadenopathy] : no lymphadenopathy [No Respiratory Distress] : no respiratory distress  [Supple] : supple [No Accessory Muscle Use] : no accessory muscle use [Clear to Auscultation] : lungs were clear to auscultation bilaterally [Normal Rate] : normal rate  [Normal S1, S2] : normal S1 and S2 [Regular Rhythm] : with a regular rhythm [No Murmur] : no murmur heard [No Edema] : there was no peripheral edema [Pedal Pulses Present] : the pedal pulses are present [Soft] : abdomen soft [Non Tender] : non-tender [Normal Bowel Sounds] : normal bowel sounds [No Joint Swelling] : no joint swelling [Grossly Normal Strength/Tone] : grossly normal strength/tone [Memory Grossly Normal] : memory grossly normal [Speech Grossly Normal] : speech grossly normal [Normal Affect] : the affect was normal [Alert and Oriented x3] : oriented to person, place, and time

## 2019-10-22 LAB
ALBUMIN SERPL ELPH-MCNC: 4.3 G/DL
ALP BLD-CCNC: 68 U/L
ALT SERPL-CCNC: 15 U/L
ANION GAP SERPL CALC-SCNC: 11 MMOL/L
AST SERPL-CCNC: 15 U/L
BASOPHILS # BLD AUTO: 0.08 K/UL
BASOPHILS NFR BLD AUTO: 1.2 %
BILIRUB SERPL-MCNC: 0.6 MG/DL
BUN SERPL-MCNC: 13 MG/DL
CALCIUM SERPL-MCNC: 9.8 MG/DL
CHLORIDE SERPL-SCNC: 106 MMOL/L
CHOLEST SERPL-MCNC: 182 MG/DL
CHOLEST/HDLC SERPL: 2.7 RATIO
CO2 SERPL-SCNC: 24 MMOL/L
CREAT SERPL-MCNC: 0.88 MG/DL
EOSINOPHIL # BLD AUTO: 0.37 K/UL
EOSINOPHIL NFR BLD AUTO: 5.4 %
ESTIMATED AVERAGE GLUCOSE: 108 MG/DL
GLUCOSE SERPL-MCNC: 105 MG/DL
HBA1C MFR BLD HPLC: 5.4 %
HCT VFR BLD CALC: 43.8 %
HDLC SERPL-MCNC: 68 MG/DL
HGB BLD-MCNC: 14.2 G/DL
IMM GRANULOCYTES NFR BLD AUTO: 0.3 %
LDLC SERPL CALC-MCNC: 101 MG/DL
LYMPHOCYTES # BLD AUTO: 1.87 K/UL
LYMPHOCYTES NFR BLD AUTO: 27.3 %
MAN DIFF?: NORMAL
MCHC RBC-ENTMCNC: 30.1 PG
MCHC RBC-ENTMCNC: 32.4 GM/DL
MCV RBC AUTO: 92.8 FL
MONOCYTES # BLD AUTO: 0.57 K/UL
MONOCYTES NFR BLD AUTO: 8.3 %
NEUTROPHILS # BLD AUTO: 3.94 K/UL
NEUTROPHILS NFR BLD AUTO: 57.5 %
PLATELET # BLD AUTO: 304 K/UL
POTASSIUM SERPL-SCNC: 4.4 MMOL/L
PROT SERPL-MCNC: 6.9 G/DL
RBC # BLD: 4.72 M/UL
RBC # FLD: 12.9 %
SODIUM SERPL-SCNC: 141 MMOL/L
TRIGL SERPL-MCNC: 66 MG/DL
WBC # FLD AUTO: 6.85 K/UL

## 2019-10-23 LAB — 25(OH)D3 SERPL-MCNC: 35.7 NG/ML

## 2019-10-29 ENCOUNTER — APPOINTMENT (OUTPATIENT)
Dept: DERMATOLOGY | Facility: CLINIC | Age: 54
End: 2019-10-29
Payer: MEDICARE

## 2019-10-29 PROCEDURE — 99214 OFFICE O/P EST MOD 30 MIN: CPT

## 2019-11-12 ENCOUNTER — APPOINTMENT (OUTPATIENT)
Dept: FAMILY MEDICINE | Facility: CLINIC | Age: 54
End: 2019-11-12
Payer: MEDICARE

## 2019-11-12 ENCOUNTER — NON-APPOINTMENT (OUTPATIENT)
Age: 54
End: 2019-11-12

## 2019-11-12 VITALS
SYSTOLIC BLOOD PRESSURE: 130 MMHG | HEIGHT: 65 IN | DIASTOLIC BLOOD PRESSURE: 76 MMHG | WEIGHT: 185 LBS | HEART RATE: 80 BPM | BODY MASS INDEX: 30.82 KG/M2

## 2019-11-12 PROCEDURE — G0439: CPT

## 2019-11-12 PROCEDURE — 93000 ELECTROCARDIOGRAM COMPLETE: CPT

## 2019-11-12 NOTE — PHYSICAL EXAM
[No Acute Distress] : no acute distress [Well Nourished] : well nourished [Well-Appearing] : well-appearing [PERRL] : pupils equal round and reactive to light [Normal Sclera/Conjunctiva] : normal sclera/conjunctiva [Normal Oropharynx] : the oropharynx was normal [Normal Outer Ear/Nose] : the outer ears and nose were normal in appearance [Normal TMs] : both tympanic membranes were normal [No Lymphadenopathy] : no lymphadenopathy [No Respiratory Distress] : no respiratory distress  [Supple] : supple [No Accessory Muscle Use] : no accessory muscle use [Clear to Auscultation] : lungs were clear to auscultation bilaterally [Normal Rate] : normal rate  [Regular Rhythm] : with a regular rhythm [Normal S1, S2] : normal S1 and S2 [No Murmur] : no murmur heard [Pedal Pulses Present] : the pedal pulses are present [Soft] : abdomen soft [No Edema] : there was no peripheral edema [Non Tender] : non-tender [Normal Bowel Sounds] : normal bowel sounds [No Joint Swelling] : no joint swelling [Grossly Normal Strength/Tone] : grossly normal strength/tone [Coordination Grossly Intact] : coordination grossly intact [No Focal Deficits] : no focal deficits [Deep Tendon Reflexes (DTR)] : deep tendon reflexes were 2+ and symmetric [Speech Grossly Normal] : speech grossly normal [Normal Gait] : normal gait [Memory Grossly Normal] : memory grossly normal [Normal Insight/Judgement] : insight and judgment were intact [No Rash] : no rash [de-identified] : GLASSES

## 2019-11-12 NOTE — PLAN
[FreeTextEntry1] : RECENT LAB WORK REVIEWED\par VISION SCREENING \par EKG: NSR AT 67 BPM, NO ACUTE ST-T WAVE CHANGES\par STOOL OCCULT\par HEALTHY DIET AND LIFESTYLE MODIFICATIONS\par HEALTHY WEIGHT LOSS\par CONSIDER SHINGRIX\par CONTINUE ALL MEDICATIONS AS DIRECTED\par FOLLOW-UP WITH ALL SPECIALISTS AS DIRECTED\par CALL WITH ANY QUESTIONS, CONCERNS OR CHANGES

## 2019-11-12 NOTE — HEALTH RISK ASSESSMENT
[Good] : ~his/her~  mood as  good [No falls in past year] : Patient reported no falls in the past year [Patient reported mammogram was normal] : Patient reported mammogram was normal [Patient reported PAP Smear was normal] : Patient reported PAP Smear was normal [Patient reported colonoscopy was normal] : Patient reported colonoscopy was normal [Handling Complex Tasks] : difficulty handling complex tasks [None] : None [With Family] : lives with family [# of Members in Household ___] :  household currently consist of [unfilled] member(s) [Single] : single [Employed] : employed [Feels Safe at Home] : Feels safe at home [Fully functional (bathing, dressing, toileting, transferring, walking, feeding)] : Fully functional (bathing, dressing, toileting, transferring, walking, feeding) [Reports normal functional visual acuity (ie: able to read med bottle)] : Reports normal functional visual acuity [Smoke Detector] : smoke detector [Carbon Monoxide Detector] : carbon monoxide detector [Safety elements used in home] : safety elements used in home [Seat Belt] :  uses seat belt [With Patient/Caregiver] : With Patient/Caregiver [No] : In the past 12 months have you used drugs other than those required for medical reasons? No [Learning/Retaining New Information] : difficulty learning/retaining new information [] : No [de-identified] :  NOT REMAINING ACTIVE [de-identified] : DIET WITH ROOM FOR IMPROVEMENT [Change in mental status noted] : No change in mental status noted [Language] : denies difficulty with language [Behavior] : denies difficulty with behavior [Reasoning] : denies difficulty with reasoning [Reports changes in hearing] : Reports no changes in hearing [Reports changes in vision] : Reports no changes in vision [Reports changes in dental health] : Reports no changes in dental health [Sunscreen] : does not use sunscreen [MammogramDate] : 01/19 [PapSmearDate] : 01/19 [PapSmearComments] : DR. HERNANDEZ [ColonoscopyDate] : 08/16 [de-identified] : HAS ASSISTANCE WITH FINANCES.  USES SCAT TRANSPORTATION [de-identified] : WEARS GLASSES FOR READING AND DISTANCE [de-identified] : SEEN ROUTINELY  [AdvancecareDate] : 11/19

## 2019-11-12 NOTE — REVIEW OF SYSTEMS
[Fever] : no fever [Chills] : no chills [Fatigue] : no fatigue [Discharge] : no discharge [Pain] : no pain [Earache] : no earache [Hearing Loss] : no hearing loss [Nasal Discharge] : no nasal discharge [Sore Throat] : no sore throat [Chest Pain] : no chest pain [Palpitations] : no palpitations [Lower Ext Edema] : no lower extremity edema [Shortness Of Breath] : no shortness of breath [Wheezing] : no wheezing [Cough] : no cough [Abdominal Pain] : no abdominal pain [Nausea] : no nausea [Diarrhea] : diarrhea [Vomiting] : no vomiting [Dysuria] : no dysuria [Hematuria] : no hematuria [Joint Pain] : no joint pain [Muscle Weakness] : no muscle weakness [Muscle Pain] : no muscle pain [Itching] : no itching [Skin Rash] : no skin rash [Headache] : no headache [Dizziness] : no dizziness [Fainting] : no fainting [Suicidal] : not suicidal [Anxiety] : no anxiety [Depression] : no depression [Easy Bleeding] : no easy bleeding [Easy Bruising] : no easy bruising

## 2019-11-12 NOTE — HISTORY OF PRESENT ILLNESS
[Formal Caregiver] : formal caregiver [FreeTextEntry1] : WELLNESS EXAM [de-identified] : MS. WESTFALL IS A PLEASANT 55 YO PRESENTING FOR ANNUAL WELLNESS EXAM WITH . CHRONIC HISTORY OF ASTHMA, OBESITY AND VITAMIN D INSUFFICIENCY. COMPLIANT TO MEDICATIONS.  HAS HAD NO HEADACHES, DIZZINESS, CHEST PAIN, SHORTNESS OF BREATH, GI OR URINARY COMPLAINTS. NO OTHER COMPLAINTS TODAY. \par \par SPECIALISTS:\par ENDOCRINOLOGY - DR. BAILEY\par GYN - DR. HERNANDEZ\par ALLERGIST - DR. SILVA\par OPTHALMOLOGY - DR. CROWLEY\par DERMATOLOGY - DR. BARBOSA

## 2020-01-14 ENCOUNTER — APPOINTMENT (OUTPATIENT)
Dept: FAMILY MEDICINE | Facility: CLINIC | Age: 55
End: 2020-01-14
Payer: MEDICARE

## 2020-01-14 VITALS
HEART RATE: 84 BPM | SYSTOLIC BLOOD PRESSURE: 140 MMHG | HEIGHT: 65 IN | WEIGHT: 184 LBS | BODY MASS INDEX: 30.66 KG/M2 | DIASTOLIC BLOOD PRESSURE: 90 MMHG

## 2020-01-14 PROCEDURE — 99213 OFFICE O/P EST LOW 20 MIN: CPT

## 2020-01-14 NOTE — PHYSICAL EXAM
[Well Nourished] : well nourished [No Acute Distress] : no acute distress [Well-Appearing] : well-appearing [Normal Outer Ear/Nose] : the outer ears and nose were normal in appearance [Normal Oropharynx] : the oropharynx was normal [No Lymphadenopathy] : no lymphadenopathy [Normal TMs] : both tympanic membranes were normal [Supple] : supple [No Respiratory Distress] : no respiratory distress  [No Accessory Muscle Use] : no accessory muscle use [Regular Rhythm] : with a regular rhythm [Clear to Auscultation] : lungs were clear to auscultation bilaterally [Normal Rate] : normal rate  [Normal S1, S2] : normal S1 and S2 [No Murmur] : no murmur heard

## 2020-01-15 NOTE — HISTORY OF PRESENT ILLNESS
[FreeTextEntry1] : BRONCHITIS, ASTHMA [de-identified] : MS. WESTFALL IS A PLEASANT 55 YO PRESENTING FOR FOLLOW-UP. CHRONIC HISTORY OF ASTHMA, OBESITY AND VITAMIN D INSUFFICIENCY. HAS HAD A COUGH FOR THE PAST 2 WEEKS. SAW HER ALLERGIST THIS MORNING FOR EVALUATION AND STARTED ON Z-OSWALD FOR BRONCHITIS.  HAS HAD NO POSTNASAL DRIP. NO FEVER. DENIES N/V/D.  CHRONIC HISTORY OF ASTHMA ON ADVAIR.   USES RESCUE INHALER EVERY FEW DAYS. NOTES INCREASED INHALER USE WITH WEATHER CHANGES, INCREASED ACTIVITY AND WHEN SICK. HAS HAD NO HEADACHES, DIZZINESS, CHEST PAIN, SHORTNESS OF BREATH, GI OR URINARY COMPLAINTS. NO OTHER COMPLAINTS TODAY.

## 2020-01-15 NOTE — PLAN
[FreeTextEntry1] : ASTHMA ACTION PLAN REVIEWED\par CONTINUE SUPPORTIVE CARE: REST, FLUIDS, STEAM\par TO TAKE MEDICATION PRESCRIBED BY ALLERGIST\par FOLLOW-UP ALL SPECIALISTS AS DIRECTED \par CALL WITH ANY QUESTIONS, CONCERNS OR CHANGES

## 2020-01-15 NOTE — REVIEW OF SYSTEMS
[Cough] : cough [Fever] : no fever [Chills] : no chills [Fatigue] : no fatigue [Earache] : no earache [Hearing Loss] : no hearing loss [Nasal Discharge] : no nasal discharge [Sore Throat] : no sore throat [Chest Pain] : no chest pain [Palpitations] : no palpitations [Lower Ext Edema] : no lower extremity edema [Shortness Of Breath] : no shortness of breath [Wheezing] : no wheezing

## 2020-01-15 NOTE — ADDENDUM
[FreeTextEntry1] : I, Caitlyn Dickson, solely acted as scribe for Dr. Marcelina Diana on 1/14/2020.

## 2020-01-16 ENCOUNTER — APPOINTMENT (OUTPATIENT)
Dept: FAMILY MEDICINE | Facility: CLINIC | Age: 55
End: 2020-01-16

## 2020-02-20 ENCOUNTER — RX RENEWAL (OUTPATIENT)
Age: 55
End: 2020-02-20

## 2020-03-26 ENCOUNTER — APPOINTMENT (OUTPATIENT)
Dept: ENDOCRINOLOGY | Facility: CLINIC | Age: 55
End: 2020-03-26

## 2020-04-07 ENCOUNTER — APPOINTMENT (OUTPATIENT)
Dept: FAMILY MEDICINE | Facility: CLINIC | Age: 55
End: 2020-04-07
Payer: MEDICARE

## 2020-04-07 PROCEDURE — 99442: CPT

## 2020-04-23 ENCOUNTER — RX RENEWAL (OUTPATIENT)
Age: 55
End: 2020-04-23

## 2020-04-28 ENCOUNTER — APPOINTMENT (OUTPATIENT)
Dept: ENDOCRINOLOGY | Facility: CLINIC | Age: 55
End: 2020-04-28
Payer: MEDICARE

## 2020-04-28 VITALS
DIASTOLIC BLOOD PRESSURE: 70 MMHG | HEART RATE: 64 BPM | SYSTOLIC BLOOD PRESSURE: 120 MMHG | HEIGHT: 65 IN | BODY MASS INDEX: 29.82 KG/M2 | WEIGHT: 179 LBS

## 2020-04-28 PROCEDURE — 99204 OFFICE O/P NEW MOD 45 MIN: CPT

## 2020-04-28 NOTE — HISTORY OF PRESENT ILLNESS
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

## 2020-04-28 NOTE — REASON FOR VISIT
[Initial Evaluation] : an initial evaluation [Thyroid nodule/ MNG] : thyroid nodule/ MNG [Friend] : friend

## 2020-04-28 NOTE — ASSESSMENT
[Carbohydrate Consistent Diet] : carbohydrate consistent diet [Importance of Diet and Exercise] : importance of diet and exercise to improve glycemic control, achieve weight loss and improve cardiovascular health [FreeTextEntry1] : NTMNG \par check Tfts \par no family h/o thyroid cancer, no neck XRT\par no dysphagia, no dysphonia, no neck pain, no voice change\par she had FNA many years ago. \par \par HypoT: check tfts. continue with LT4 50 mcg qd \par \par vitamin d deficiency: continue supplements \par \par obesity: discussed diet and exercise\par encouraged more exercise walking 30 min 3 x week\par \par \par

## 2020-04-28 NOTE — PHYSICAL EXAM
[Alert] : alert [Well Nourished] : well nourished [No Acute Distress] : no acute distress [Well Developed] : well developed [Normal Sclera/Conjunctiva] : normal sclera/conjunctiva [EOMI] : extra ocular movement intact [No Proptosis] : no proptosis [Normal Oropharynx] : the oropharynx was normal [Well Healed Scar] : well healed scar [No Respiratory Distress] : no respiratory distress [No Accessory Muscle Use] : no accessory muscle use [Clear to Auscultation] : lungs were clear to auscultation bilaterally [Normal S1, S2] : normal S1 and S2 [Normal Rate] : heart rate was normal [Regular Rhythm] : with a regular rhythm [No Edema] : no peripheral edema [Pedal Pulses Normal] : the pedal pulses are present [Normal Bowel Sounds] : normal bowel sounds [Not Tender] : non-tender [Not Distended] : not distended [Soft] : abdomen soft [Normal Anterior Cervical Nodes] : no anterior cervical lymphadenopathy [Normal Posterior Cervical Nodes] : no posterior cervical lymphadenopathy [No Spinal Tenderness] : no spinal tenderness [Spine Straight] : spine straight [No Stigmata of Cushings Syndrome] : no stigmata of Cushings Syndrome [Normal Gait] : normal gait [Normal Strength/Tone] : muscle strength and tone were normal [No Rash] : no rash [Normal Reflexes] : deep tendon reflexes were 2+ and symmetric [No Tremors] : no tremors [Oriented x3] : oriented to person, place, and time [Acanthosis Nigricans] : no acanthosis nigricans [de-identified] : mildly enlarged with R nodule palpated 1 cm

## 2020-05-01 ENCOUNTER — RX RENEWAL (OUTPATIENT)
Age: 55
End: 2020-05-01

## 2020-05-12 ENCOUNTER — OUTPATIENT (OUTPATIENT)
Dept: OUTPATIENT SERVICES | Facility: HOSPITAL | Age: 55
LOS: 1 days | End: 2020-05-12
Payer: MEDICARE

## 2020-05-12 ENCOUNTER — APPOINTMENT (OUTPATIENT)
Dept: ULTRASOUND IMAGING | Facility: CLINIC | Age: 55
End: 2020-05-12
Payer: MEDICARE

## 2020-05-12 DIAGNOSIS — Z00.00 ENCOUNTER FOR GENERAL ADULT MEDICAL EXAMINATION WITHOUT ABNORMAL FINDINGS: ICD-10-CM

## 2020-05-12 PROCEDURE — 76536 US EXAM OF HEAD AND NECK: CPT | Mod: 26

## 2020-05-12 PROCEDURE — 76536 US EXAM OF HEAD AND NECK: CPT

## 2020-05-13 LAB
T3 SERPL-MCNC: 116 NG/DL
T4 FREE SERPL-MCNC: 1.1 NG/DL
TSH SERPL-ACNC: 2.56 UIU/ML

## 2020-07-04 ENCOUNTER — LABORATORY RESULT (OUTPATIENT)
Age: 55
End: 2020-07-04

## 2020-07-07 ENCOUNTER — APPOINTMENT (OUTPATIENT)
Dept: FAMILY MEDICINE | Facility: CLINIC | Age: 55
End: 2020-07-07
Payer: MEDICARE

## 2020-07-07 VITALS
SYSTOLIC BLOOD PRESSURE: 130 MMHG | HEIGHT: 65 IN | WEIGHT: 185 LBS | DIASTOLIC BLOOD PRESSURE: 90 MMHG | BODY MASS INDEX: 30.82 KG/M2 | TEMPERATURE: 98.4 F | HEART RATE: 66 BPM

## 2020-07-07 DIAGNOSIS — J45.909 UNSPECIFIED ASTHMA, UNCOMPLICATED: ICD-10-CM

## 2020-07-07 DIAGNOSIS — Z86.19 PERSONAL HISTORY OF OTHER INFECTIOUS AND PARASITIC DISEASES: ICD-10-CM

## 2020-07-07 PROCEDURE — 99213 OFFICE O/P EST LOW 20 MIN: CPT

## 2020-07-07 NOTE — PHYSICAL EXAM
[No Acute Distress] : no acute distress [Well Nourished] : well nourished [Well-Appearing] : well-appearing [Normal Outer Ear/Nose] : the outer ears and nose were normal in appearance [Normal Oropharynx] : the oropharynx was normal [Normal TMs] : both tympanic membranes were normal [No Lymphadenopathy] : no lymphadenopathy [Supple] : supple [No Accessory Muscle Use] : no accessory muscle use [No Respiratory Distress] : no respiratory distress  [Clear to Auscultation] : lungs were clear to auscultation bilaterally [Normal Rate] : normal rate  [Regular Rhythm] : with a regular rhythm [Normal S1, S2] : normal S1 and S2

## 2020-07-07 NOTE — PLAN
[FreeTextEntry1] : NEED MAMMOGRAM REPORT FROM [facility]\par ASTHMA CONTROLLED\par ASTHMA ACTION PLAN REVIEWED\par FLU VACCINE IN THE FALL RECOMMENDED\par CONSIDER SHINGRIX\par FOLLOW-UP ALL SPECIALISTS AS DIRECTED \par CALL WITH ANY QUESTIONS, CONCERNS OR CHANGES \par CPE IN THE FALL

## 2020-07-07 NOTE — HISTORY OF PRESENT ILLNESS
[FreeTextEntry1] : f/u asthma [de-identified] : MS. WESTFALL IS A PLEASANT 54 YO PRESENTING FOR FOLLOW-UP.  CHRONIC HISTORY OF ASTHMA, HYPOTHYROIDISM, OBESITY, THYROID NODULE AND VITAMIN D INSUFFICIENCY.  ASTHMA HAS BEEN "GOOD".  TAKING ADVAIR AND SINGULAIR DAILY.  WILL USE RESCUE INHALER ONCE A WEEK ON AVERAGE.  WORSE WITH HUMIDITY.  SEES ALLERGIST ROUTINELY.  SAW ENDOCRINOLOGY FOR EVALUATION OF THYROID.  HAD TFT'S CHECKED.  HAD THYROID SONOGRAM.  TAKING VITAMIN D SUPPLEMENTS FOR VITAMIN D INSUFFICIENCY.  HAD MAMMOGRAM IN FEBRUARY AT [facility].  SAW EYE DOCTOR RECENTLY.  SEES GYN ROUTINLY.

## 2020-08-27 ENCOUNTER — RX RENEWAL (OUTPATIENT)
Age: 55
End: 2020-08-27

## 2020-09-10 ENCOUNTER — RX RENEWAL (OUTPATIENT)
Age: 55
End: 2020-09-10

## 2020-10-22 PROBLEM — Z00.00 MEDICARE ANNUAL WELLNESS VISIT, SUBSEQUENT: Status: ACTIVE | Noted: 2017-05-03

## 2020-10-25 ENCOUNTER — RX RENEWAL (OUTPATIENT)
Age: 55
End: 2020-10-25

## 2020-10-27 ENCOUNTER — APPOINTMENT (OUTPATIENT)
Dept: DERMATOLOGY | Facility: CLINIC | Age: 55
End: 2020-10-27
Payer: MEDICARE

## 2020-10-27 ENCOUNTER — RESULT REVIEW (OUTPATIENT)
Age: 55
End: 2020-10-27

## 2020-10-27 PROCEDURE — 17110 DESTRUCTION B9 LES UP TO 14: CPT

## 2020-10-27 PROCEDURE — 17000 DESTRUCT PREMALG LESION: CPT | Mod: 59

## 2020-10-27 PROCEDURE — 99214 OFFICE O/P EST MOD 30 MIN: CPT | Mod: 25

## 2020-11-06 ENCOUNTER — APPOINTMENT (OUTPATIENT)
Dept: DERMATOLOGY | Facility: CLINIC | Age: 55
End: 2020-11-06
Payer: MEDICARE

## 2020-11-06 PROCEDURE — 99213 OFFICE O/P EST LOW 20 MIN: CPT | Mod: 24

## 2020-11-20 ENCOUNTER — APPOINTMENT (OUTPATIENT)
Dept: ENDOCRINOLOGY | Facility: CLINIC | Age: 55
End: 2020-11-20
Payer: MEDICARE

## 2020-11-20 VITALS
HEIGHT: 65 IN | DIASTOLIC BLOOD PRESSURE: 88 MMHG | HEART RATE: 70 BPM | BODY MASS INDEX: 31.99 KG/M2 | SYSTOLIC BLOOD PRESSURE: 132 MMHG | TEMPERATURE: 97.2 F | WEIGHT: 192 LBS | OXYGEN SATURATION: 99 % | RESPIRATION RATE: 18 BRPM

## 2020-11-20 PROCEDURE — 99214 OFFICE O/P EST MOD 30 MIN: CPT

## 2020-11-20 NOTE — ASSESSMENT
[Weight Loss] : weight loss [Levothyroxine] : The patient was instructed to take Levothyroxine on an empty stomach, separate from vitamins, and wait at least 30 minutes before eating [FreeTextEntry1] : NTMNG s/p L thyroidectomy. \par US report was reviewed and showed no residual nodules. \par no family h/o thyroid cancer, no neck XRT\par no dysphagia, no dysphonia, no neck pain, no voice change\par \par HypoT: check tfts. continue with LT4 50 mcg qd \par Take daily in AM on an empty stomach at least 30 minutes before eating or taking other medication.\par \par vitamin d deficiency: continue supplements \par discussed importance in calcium metabolism. \par \par obesity: discussed diet and exercise\par encouraged more exercise walking 30 min 3 x week\par \par \par

## 2020-11-20 NOTE — PHYSICAL EXAM
[Alert] : alert [Well Nourished] : well nourished [No Acute Distress] : no acute distress [Well Developed] : well developed [Normal Sclera/Conjunctiva] : normal sclera/conjunctiva [EOMI] : extra ocular movement intact [No Proptosis] : no proptosis [Normal Oropharynx] : the oropharynx was normal [Thyroid Not Enlarged] : the thyroid was not enlarged [No Thyroid Nodules] : no palpable thyroid nodules [Well Healed Scar] : well healed scar [No Respiratory Distress] : no respiratory distress [No Accessory Muscle Use] : no accessory muscle use [Clear to Auscultation] : lungs were clear to auscultation bilaterally [Normal S1, S2] : normal S1 and S2 [Normal Rate] : heart rate was normal [Regular Rhythm] : with a regular rhythm [No Edema] : no peripheral edema [Pedal Pulses Normal] : the pedal pulses are present [Normal Bowel Sounds] : normal bowel sounds [Not Tender] : non-tender [Not Distended] : not distended [Soft] : abdomen soft [Normal Anterior Cervical Nodes] : no anterior cervical lymphadenopathy [Normal Posterior Cervical Nodes] : no posterior cervical lymphadenopathy [Spine Straight] : spine straight [Normal Gait] : normal gait [No Rash] : no rash [No Tremors] : no tremors [Oriented x3] : oriented to person, place, and time [Acanthosis Nigricans] : no acanthosis nigricans [de-identified] : mildly enlarged with R nodule palpated 1 cm  [de-identified] : varicose veins both ankles.

## 2020-11-20 NOTE — HISTORY OF PRESENT ILLNESS
[FreeTextEntry1] : NTMNG.   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

## 2020-11-24 ENCOUNTER — APPOINTMENT (OUTPATIENT)
Dept: ORTHOPEDIC SURGERY | Facility: CLINIC | Age: 55
End: 2020-11-24
Payer: MEDICARE

## 2020-11-24 VITALS
DIASTOLIC BLOOD PRESSURE: 75 MMHG | SYSTOLIC BLOOD PRESSURE: 128 MMHG | HEIGHT: 65 IN | HEART RATE: 80 BPM | WEIGHT: 170 LBS | BODY MASS INDEX: 28.32 KG/M2

## 2020-11-24 PROCEDURE — 73110 X-RAY EXAM OF WRIST: CPT | Mod: LT

## 2020-11-24 PROCEDURE — 99204 OFFICE O/P NEW MOD 45 MIN: CPT

## 2020-12-15 ENCOUNTER — APPOINTMENT (OUTPATIENT)
Dept: FAMILY MEDICINE | Facility: CLINIC | Age: 55
End: 2020-12-15
Payer: MEDICARE

## 2020-12-15 DIAGNOSIS — Z11.1 ENCOUNTER FOR SCREENING FOR RESPIRATORY TUBERCULOSIS: ICD-10-CM

## 2020-12-15 DIAGNOSIS — Z23 ENCOUNTER FOR IMMUNIZATION: ICD-10-CM

## 2020-12-15 PROCEDURE — 99213 OFFICE O/P EST LOW 20 MIN: CPT | Mod: 25

## 2020-12-15 PROCEDURE — 86580 TB INTRADERMAL TEST: CPT

## 2020-12-15 NOTE — ASSESSMENT
[FreeTextEntry1] : \par \par PPD placed today by nurse Natalie. Will read within 48-72 hrs\par \par Due for her CPE, I advised her to schedule an appointment\par Follows up with Dr Koroma (endocrino) for her hypothyroidism)\par Follows up with Dr Harper for her asthma\par \par Return to care: within 48-72 hrs for her PPD reading\par Call or return for any questions

## 2020-12-15 NOTE — HISTORY OF PRESENT ILLNESS
[FreeTextEntry8] : Ms. GERALD WESTFALL is a 55 year old female with PMH of asthma, hypothyroidism, obesity, thyroid nodule, Vit D insufficiency, specific developmental disorder, who normally follows up with Dr Diana. Patient comes to the office accompanied by Rita whom she refers to as her staff Patient is here for PPD placement. Denies TB contacts, denies positive PPDs, denies night sweats, fever, weight loss or other complaints

## 2020-12-29 ENCOUNTER — APPOINTMENT (OUTPATIENT)
Dept: FAMILY MEDICINE | Facility: CLINIC | Age: 55
End: 2020-12-29
Payer: MEDICARE

## 2020-12-29 VITALS
BODY MASS INDEX: 32.65 KG/M2 | WEIGHT: 196 LBS | DIASTOLIC BLOOD PRESSURE: 87 MMHG | SYSTOLIC BLOOD PRESSURE: 155 MMHG | HEART RATE: 76 BPM | RESPIRATION RATE: 14 BRPM | OXYGEN SATURATION: 97 % | HEIGHT: 65 IN | TEMPERATURE: 98 F

## 2020-12-29 PROCEDURE — 86580 TB INTRADERMAL TEST: CPT

## 2021-02-16 ENCOUNTER — APPOINTMENT (OUTPATIENT)
Dept: FAMILY MEDICINE | Facility: CLINIC | Age: 56
End: 2021-02-16
Payer: MEDICARE

## 2021-02-16 ENCOUNTER — NON-APPOINTMENT (OUTPATIENT)
Age: 56
End: 2021-02-16

## 2021-02-16 VITALS
WEIGHT: 196 LBS | HEIGHT: 65 IN | HEART RATE: 85 BPM | SYSTOLIC BLOOD PRESSURE: 130 MMHG | BODY MASS INDEX: 32.65 KG/M2 | DIASTOLIC BLOOD PRESSURE: 80 MMHG

## 2021-02-16 DIAGNOSIS — Z00.00 ENCOUNTER FOR GENERAL ADULT MEDICAL EXAMINATION W/OUT ABNORMAL FINDINGS: ICD-10-CM

## 2021-02-16 PROCEDURE — 36415 COLL VENOUS BLD VENIPUNCTURE: CPT

## 2021-02-16 PROCEDURE — 93000 ELECTROCARDIOGRAM COMPLETE: CPT

## 2021-02-16 PROCEDURE — G0439: CPT

## 2021-02-16 NOTE — HISTORY OF PRESENT ILLNESS
[FreeTextEntry1] : CPE [de-identified] : MS. WESTFALL IS A PLEASANT 54 YO PRESENTING FOR YEARLY CPE.  SWITCHED ENDOCRINOLOGISTS TO DR. LOCKE.  WAS EVALUATED BY ORTHOPEDICS FOR GANGLION CYST ON LEFT WRIST.  DUE FOR MAMMOGRAM.  HAS NO BREAST COMPLAINTS.  NO PAIN, NIPPLE DISCHARGE, SKIN CHANGES OR LUMPS.  SEES GYN ROUTINELY.  ASTHMA CONTROLLED.

## 2021-02-16 NOTE — HEALTH RISK ASSESSMENT
[Good] : ~his/her~  mood as  good [No] : In the past 12 months have you used drugs other than those required for medical reasons? No [No falls in past year] : Patient reported no falls in the past year [HIV test declined] : HIV test declined [Hepatitis C test declined] : Hepatitis C test declined [Learning/Retaining New Information] : difficulty learning/retaining new information [Handling Complex Tasks] : difficulty handling complex tasks [Health Literacy] : health literacy [With Significant Other] : lives with significant other [Employed] : employed [Single] : single [# Of Children ___] : has [unfilled] children [High School] : high school [Feels Safe at Home] : Feels safe at home [Smoke Detector] : smoke detector [Carbon Monoxide Detector] : carbon monoxide detector [Safety elements used in home] : safety elements used in home [Seat Belt] :  uses seat belt [] : No [de-identified] : WALKING DAILY [de-identified] : COULD BE IMPROVED. [Sexually Active] : not sexually active [High Risk Behavior] : no high risk behavior [Reports changes in hearing] : Reports no changes in hearing [Reports changes in vision] : Reports no changes in vision [Reports changes in dental health] : Reports no changes in dental health [Sunscreen] : does not use sunscreen [MammogramDate] : 03/20 [PapSmearDate] : 02/20 [PapSmearComments] : DR. MARY BARBER [ColonoscopyDate] : 08/16 [de-identified] : HAS A  [FreeTextEntry2] : SHOPRITE AND FOREVER 19 [de-identified] : SKILLS UNLIMITED [de-identified] : SEES OPHTHALMOLOGY EVERY 6 MONTHS [de-identified] : SEES DENTIST ROUTINELY

## 2021-02-17 LAB
25(OH)D3 SERPL-MCNC: 54.4 NG/ML
APPEARANCE: CLEAR
BASOPHILS # BLD AUTO: 0.08 K/UL
BASOPHILS NFR BLD AUTO: 1.3 %
BILIRUBIN URINE: NEGATIVE
BLOOD URINE: NEGATIVE
COLOR: YELLOW
EOSINOPHIL # BLD AUTO: 0.47 K/UL
EOSINOPHIL NFR BLD AUTO: 7.7 %
GLUCOSE QUALITATIVE U: NEGATIVE
HCT VFR BLD CALC: 42.8 %
HGB BLD-MCNC: 13.7 G/DL
IMM GRANULOCYTES NFR BLD AUTO: 0.2 %
KETONES URINE: NEGATIVE
LEUKOCYTE ESTERASE URINE: NEGATIVE
LYMPHOCYTES # BLD AUTO: 1.67 K/UL
LYMPHOCYTES NFR BLD AUTO: 27.5 %
MAN DIFF?: NORMAL
MCHC RBC-ENTMCNC: 29.3 PG
MCHC RBC-ENTMCNC: 32 GM/DL
MCV RBC AUTO: 91.6 FL
MONOCYTES # BLD AUTO: 0.51 K/UL
MONOCYTES NFR BLD AUTO: 8.4 %
NEUTROPHILS # BLD AUTO: 3.34 K/UL
NEUTROPHILS NFR BLD AUTO: 54.9 %
NITRITE URINE: NEGATIVE
PH URINE: 7
PLATELET # BLD AUTO: 314 K/UL
PROTEIN URINE: NORMAL
RBC # BLD: 4.67 M/UL
RBC # FLD: 13.2 %
SPECIFIC GRAVITY URINE: 1.02
UROBILINOGEN URINE: NORMAL
WBC # FLD AUTO: 6.08 K/UL

## 2021-05-21 ENCOUNTER — APPOINTMENT (OUTPATIENT)
Dept: ENDOCRINOLOGY | Facility: CLINIC | Age: 56
End: 2021-05-21
Payer: MEDICARE

## 2021-05-21 VITALS
DIASTOLIC BLOOD PRESSURE: 80 MMHG | BODY MASS INDEX: 33.15 KG/M2 | WEIGHT: 199 LBS | SYSTOLIC BLOOD PRESSURE: 128 MMHG | HEIGHT: 65 IN

## 2021-05-21 DIAGNOSIS — E04.1 NONTOXIC SINGLE THYROID NODULE: ICD-10-CM

## 2021-05-21 DIAGNOSIS — E03.9 HYPOTHYROIDISM, UNSPECIFIED: ICD-10-CM

## 2021-05-21 DIAGNOSIS — E55.9 VITAMIN D DEFICIENCY, UNSPECIFIED: ICD-10-CM

## 2021-05-21 DIAGNOSIS — E66.9 OBESITY, UNSPECIFIED: ICD-10-CM

## 2021-05-21 PROCEDURE — 99213 OFFICE O/P EST LOW 20 MIN: CPT

## 2021-05-21 NOTE — ASSESSMENT
[Carbohydrate Consistent Diet] : carbohydrate consistent diet [Exercise/Effect on Glucose] : exercise/effect on glucose [FreeTextEntry1] : NTMNG s/p L thyroidectomy. \par US report was reviewed and showed no residual nodules. No need to repeat US \par no compressive sx \par \par HypoT: check tfts. continue with LT4 50 mcg qd \par Take daily in AM on an empty stomach at least 30 minutes before eating or taking other medication.\par \par vitamin d deficiency: continue supplements .25OHD normal. \par \par obesity: gained 29 lbs. \par discussed diet and exercise\par encouraged more exercise walking 30 min 3 x week\par \par \par

## 2021-05-21 NOTE — REASON FOR VISIT
[FreeTextEntry1] : \par Metamucil in AM, Lactulose in PM\par Having daily BMs - formed\par Having abd pains - across abd; different from the pain shes had for 30 years\par THis pain is more of a gas pain - can keep her up at night\par \par Has an appetite\par \par Going to [place] this Saturday\par  [Follow - Up] : a follow-up visit [DM Type 2] : DM Type 2

## 2021-05-21 NOTE — PHYSICAL EXAM
[Alert] : alert [Well Nourished] : well nourished [No Acute Distress] : no acute distress [Well Developed] : well developed [Normal Sclera/Conjunctiva] : normal sclera/conjunctiva [EOMI] : extra ocular movement intact [No Proptosis] : no proptosis [Normal Oropharynx] : the oropharynx was normal [Thyroid Not Enlarged] : the thyroid was not enlarged [No Thyroid Nodules] : no palpable thyroid nodules [Well Healed Scar] : well healed scar [No Respiratory Distress] : no respiratory distress [No Accessory Muscle Use] : no accessory muscle use [Clear to Auscultation] : lungs were clear to auscultation bilaterally [Normal S1, S2] : normal S1 and S2 [Normal Rate] : heart rate was normal [Regular Rhythm] : with a regular rhythm [No Edema] : no peripheral edema [Pedal Pulses Normal] : the pedal pulses are present [Normal Bowel Sounds] : normal bowel sounds [Not Tender] : non-tender [Not Distended] : not distended [Soft] : abdomen soft [Normal Anterior Cervical Nodes] : no anterior cervical lymphadenopathy [Spine Straight] : spine straight [Normal Gait] : normal gait [No Rash] : no rash [No Tremors] : no tremors [Oriented x3] : oriented to person, place, and time [Acanthosis Nigricans] : no acanthosis nigricans [de-identified] : mildly enlarged with R nodule palpated 1 cm  [de-identified] : varicose veins both ankles.

## 2021-05-22 ENCOUNTER — EMERGENCY (EMERGENCY)
Facility: HOSPITAL | Age: 56
LOS: 1 days | Discharge: HOME | End: 2021-05-22
Attending: EMERGENCY MEDICINE
Payer: MEDICARE

## 2021-05-22 VITALS
OXYGEN SATURATION: 100 % | SYSTOLIC BLOOD PRESSURE: 143 MMHG | HEART RATE: 77 BPM | RESPIRATION RATE: 16 BRPM | DIASTOLIC BLOOD PRESSURE: 79 MMHG

## 2021-05-22 VITALS
RESPIRATION RATE: 22 BRPM | WEIGHT: 179.9 LBS | HEART RATE: 96 BPM | OXYGEN SATURATION: 99 % | SYSTOLIC BLOOD PRESSURE: 165 MMHG | TEMPERATURE: 98 F | DIASTOLIC BLOOD PRESSURE: 70 MMHG

## 2021-05-22 LAB
ALBUMIN SERPL ELPH-MCNC: 4.1 G/DL — SIGNIFICANT CHANGE UP (ref 3.3–5.2)
ALP SERPL-CCNC: 84 U/L — SIGNIFICANT CHANGE UP (ref 40–120)
ALT FLD-CCNC: 16 U/L — SIGNIFICANT CHANGE UP
ANION GAP SERPL CALC-SCNC: 8 MMOL/L — SIGNIFICANT CHANGE UP (ref 5–17)
AST SERPL-CCNC: 16 U/L — SIGNIFICANT CHANGE UP
BASOPHILS # BLD AUTO: 0.07 K/UL — SIGNIFICANT CHANGE UP (ref 0–0.2)
BASOPHILS NFR BLD AUTO: 0.8 % — SIGNIFICANT CHANGE UP (ref 0–2)
BILIRUB SERPL-MCNC: 0.5 MG/DL — SIGNIFICANT CHANGE UP (ref 0.4–2)
BUN SERPL-MCNC: 16 MG/DL — SIGNIFICANT CHANGE UP (ref 8–20)
CALCIUM SERPL-MCNC: 9.1 MG/DL — SIGNIFICANT CHANGE UP (ref 8.6–10.2)
CHLORIDE SERPL-SCNC: 105 MMOL/L — SIGNIFICANT CHANGE UP (ref 98–107)
CO2 SERPL-SCNC: 28 MMOL/L — SIGNIFICANT CHANGE UP (ref 22–29)
CREAT SERPL-MCNC: 0.8 MG/DL — SIGNIFICANT CHANGE UP (ref 0.5–1.3)
D DIMER BLD IA.RAPID-MCNC: 229 NG/ML DDU — SIGNIFICANT CHANGE UP
EOSINOPHIL # BLD AUTO: 0.65 K/UL — HIGH (ref 0–0.5)
EOSINOPHIL NFR BLD AUTO: 7.3 % — HIGH (ref 0–6)
GLUCOSE SERPL-MCNC: 104 MG/DL — HIGH (ref 70–99)
HCG SERPL-ACNC: <4 MIU/ML — SIGNIFICANT CHANGE UP
HCT VFR BLD CALC: 41.5 % — SIGNIFICANT CHANGE UP (ref 34.5–45)
HGB BLD-MCNC: 13.9 G/DL — SIGNIFICANT CHANGE UP (ref 11.5–15.5)
IMM GRANULOCYTES NFR BLD AUTO: 0.4 % — SIGNIFICANT CHANGE UP (ref 0–1.5)
LYMPHOCYTES # BLD AUTO: 1.83 K/UL — SIGNIFICANT CHANGE UP (ref 1–3.3)
LYMPHOCYTES # BLD AUTO: 20.4 % — SIGNIFICANT CHANGE UP (ref 13–44)
MAGNESIUM SERPL-MCNC: 2.3 MG/DL — SIGNIFICANT CHANGE UP (ref 1.6–2.6)
MCHC RBC-ENTMCNC: 29.8 PG — SIGNIFICANT CHANGE UP (ref 27–34)
MCHC RBC-ENTMCNC: 33.5 GM/DL — SIGNIFICANT CHANGE UP (ref 32–36)
MCV RBC AUTO: 88.9 FL — SIGNIFICANT CHANGE UP (ref 80–100)
MONOCYTES # BLD AUTO: 0.77 K/UL — SIGNIFICANT CHANGE UP (ref 0–0.9)
MONOCYTES NFR BLD AUTO: 8.6 % — SIGNIFICANT CHANGE UP (ref 2–14)
NEUTROPHILS # BLD AUTO: 5.6 K/UL — SIGNIFICANT CHANGE UP (ref 1.8–7.4)
NEUTROPHILS NFR BLD AUTO: 62.5 % — SIGNIFICANT CHANGE UP (ref 43–77)
NT-PROBNP SERPL-SCNC: 98 PG/ML — SIGNIFICANT CHANGE UP (ref 0–300)
PLATELET # BLD AUTO: 317 K/UL — SIGNIFICANT CHANGE UP (ref 150–400)
POTASSIUM SERPL-MCNC: 3.8 MMOL/L — SIGNIFICANT CHANGE UP (ref 3.5–5.3)
POTASSIUM SERPL-SCNC: 3.8 MMOL/L — SIGNIFICANT CHANGE UP (ref 3.5–5.3)
PROT SERPL-MCNC: 7.2 G/DL — SIGNIFICANT CHANGE UP (ref 6.6–8.7)
RBC # BLD: 4.67 M/UL — SIGNIFICANT CHANGE UP (ref 3.8–5.2)
RBC # FLD: 13.1 % — SIGNIFICANT CHANGE UP (ref 10.3–14.5)
SODIUM SERPL-SCNC: 141 MMOL/L — SIGNIFICANT CHANGE UP (ref 135–145)
TROPONIN T SERPL-MCNC: <0.01 NG/ML — SIGNIFICANT CHANGE UP (ref 0–0.06)
WBC # BLD: 8.96 K/UL — SIGNIFICANT CHANGE UP (ref 3.8–10.5)
WBC # FLD AUTO: 8.96 K/UL — SIGNIFICANT CHANGE UP (ref 3.8–10.5)

## 2021-05-22 PROCEDURE — 99285 EMERGENCY DEPT VISIT HI MDM: CPT

## 2021-05-22 PROCEDURE — 85379 FIBRIN DEGRADATION QUANT: CPT

## 2021-05-22 PROCEDURE — 71275 CT ANGIOGRAPHY CHEST: CPT | Mod: 26,MA

## 2021-05-22 PROCEDURE — 99284 EMERGENCY DEPT VISIT MOD MDM: CPT | Mod: 25

## 2021-05-22 PROCEDURE — 93010 ELECTROCARDIOGRAM REPORT: CPT

## 2021-05-22 PROCEDURE — 71275 CT ANGIOGRAPHY CHEST: CPT

## 2021-05-22 PROCEDURE — 84702 CHORIONIC GONADOTROPIN TEST: CPT

## 2021-05-22 PROCEDURE — 94640 AIRWAY INHALATION TREATMENT: CPT

## 2021-05-22 PROCEDURE — 36415 COLL VENOUS BLD VENIPUNCTURE: CPT

## 2021-05-22 PROCEDURE — 83735 ASSAY OF MAGNESIUM: CPT

## 2021-05-22 PROCEDURE — 93005 ELECTROCARDIOGRAM TRACING: CPT

## 2021-05-22 PROCEDURE — 71045 X-RAY EXAM CHEST 1 VIEW: CPT | Mod: 26

## 2021-05-22 PROCEDURE — 84484 ASSAY OF TROPONIN QUANT: CPT

## 2021-05-22 PROCEDURE — 96375 TX/PRO/DX INJ NEW DRUG ADDON: CPT | Mod: XU

## 2021-05-22 PROCEDURE — 83880 ASSAY OF NATRIURETIC PEPTIDE: CPT

## 2021-05-22 PROCEDURE — 85025 COMPLETE CBC W/AUTO DIFF WBC: CPT

## 2021-05-22 PROCEDURE — 80053 COMPREHEN METABOLIC PANEL: CPT

## 2021-05-22 PROCEDURE — 96374 THER/PROPH/DIAG INJ IV PUSH: CPT | Mod: XU

## 2021-05-22 PROCEDURE — 71045 X-RAY EXAM CHEST 1 VIEW: CPT

## 2021-05-22 RX ORDER — IPRATROPIUM/ALBUTEROL SULFATE 18-103MCG
3 AEROSOL WITH ADAPTER (GRAM) INHALATION
Refills: 0 | Status: DISCONTINUED | OUTPATIENT
Start: 2021-05-22 | End: 2021-05-26

## 2021-05-22 RX ORDER — KETOROLAC TROMETHAMINE 30 MG/ML
30 SYRINGE (ML) INJECTION ONCE
Refills: 0 | Status: DISCONTINUED | OUTPATIENT
Start: 2021-05-22 | End: 2021-05-22

## 2021-05-22 RX ADMIN — Medication 3 MILLILITER(S): at 07:42

## 2021-05-22 RX ADMIN — Medication 125 MILLIGRAM(S): at 07:42

## 2021-05-22 RX ADMIN — Medication 3 MILLILITER(S): at 07:43

## 2021-05-22 RX ADMIN — Medication 30 MILLIGRAM(S): at 07:42

## 2021-05-22 NOTE — ED PROVIDER NOTE - CLINICAL SUMMARY MEDICAL DECISION MAKING FREE TEXT BOX
57 yo female with hx asthma c/o several weeks of left back/left chest wall pain, with sob/congestion  nad  likely msk, with due to pleuritic nature of pain, will check d-dimer, labs, cxr

## 2021-05-22 NOTE — ED PROVIDER NOTE - NSFOLLOWUPINSTRUCTIONS_ED_ALL_ED_FT
This document is complete and the patient is ready for discharge.
Back Pain    WHAT YOU NEED TO KNOW:    Back pain is common. It can be caused by many conditions, such as arthritis or the breakdown of spinal discs. Your risk for back pain is increased by injuries, lack of activity, or repeated bending and twisting. You may feel sore or stiff on one or both sides of your back. The pain may spread to your buttocks or thighs.    DISCHARGE INSTRUCTIONS:    Return to the emergency department if:     You have pain, numbness, or weakness in one or both legs.      Your pain becomes so severe that you cannot walk.      You cannot control your urine or bowel movements.      You have severe back pain with chest pain.      You have severe back pain, nausea, and vomiting.      You have severe back pain that spreads to your side or genital area.    Contact your healthcare provider if:     You have back pain that does not get better with rest and pain medicine.      You have a fever.      You have pain that worsens when you are on your back or when you rest.      You have pain that worsens when you cough or sneeze.      You lose weight without trying.      You have questions or concerns about your condition or care.    Medicines:     NSAIDs help decrease swelling and pain. This medicine is available with or without a doctor's order. NSAIDs can cause stomach bleeding or kidney problems in certain people. If you take blood thinner medicine, always ask your healthcare provider if NSAIDs are safe for you. Always read the medicine label and follow directions.      Acetaminophen decreases pain and fever. It is available without a doctor's order. Ask how much to take and how often to take it. Follow directions. Read the labels of all other medicines you are using to see if they also contain acetaminophen, or ask your doctor or pharmacist. Acetaminophen can cause liver damage if not taken correctly. Do not use more than 4 grams (4,000 milligrams) total of acetaminophen in one day.       Muscle relaxers help decrease muscle spasms and back pain.      Prescription pain medicine may be given. Ask your healthcare provider how to take this medicine safely. Some prescription pain medicines contain acetaminophen. Do not take other medicines that contain acetaminophen without talking to your healthcare provider. Too much acetaminophen may cause liver damage. Prescription pain medicine may cause constipation. Ask your healthcare provider how to prevent or treat constipation.       Take your medicine as directed. Contact your healthcare provider if you think your medicine is not helping or if you have side effects. Tell him or her if you are allergic to any medicine. Keep a list of the medicines, vitamins, and herbs you take. Include the amounts, and when and why you take them. Bring the list or the pill bottles to follow-up visits. Carry your medicine list with you in case of an emergency.    How to manage your back pain:     Apply ice on your back for 15 to 20 minutes every hour or as directed. Use an ice pack, or put crushed ice in a plastic bag. Cover it with a towel before you apply it to your skin. Ice helps prevent tissue damage and decreases pain.      Apply heat on your back for 20 to 30 minutes every 2 hours for as many days as directed. Heat helps decrease pain and muscle spasms.      Stay active as much as you can without causing more pain. Bed rest could make your back pain worse. Avoid heavy lifting until your pain is gone.      Go to physical therapy as directed. A physical therapist can teach you exercises to help improve movement and strength, and to decrease pain.    Follow up with your healthcare provider in 2 weeks, or as directed: Write down your questions so you remember to ask them during your visits.       Shortness of Breath    WHAT YOU NEED TO KNOW:    Shortness of breath is a feeling that you cannot get enough air when you breathe in. You may have this feeling only during activity, or all the time. Your symptoms can range from mild to severe. Shortness of breath may be a sign of a serious health condition that needs immediate care.    DISCHARGE INSTRUCTIONS:    Return to the emergency department if:     Your signs and symptoms are the same or worse within 24 hours of treatment.       The skin over your ribs or on your neck sinks in when you breathe.       You feel confused or dizzy.    Contact your healthcare provider if:     You have new or worsening symptoms.      You have questions or concerns about your condition or care.    Medicines:     Medicines may be used to treat the cause of your symptoms. You may need medicine to treat a bacterial infection or reduce anxiety. Other medicines may be used to open your airway, reduce swelling, or remove extra fluid. If you have a heart condition, you may need medicine to help your heart beat more strongly or regularly.      Take your medicine as directed. Contact your healthcare provider if you think your medicine is not helping or if you have side effects. Tell him or her if you are allergic to any medicine. Keep a list of the medicines, vitamins, and herbs you take. Include the amounts, and when and why you take them. Bring the list or the pill bottles to follow-up visits. Carry your medicine list with you in case of an emergency.    Manage shortness of breath:     Create an action plan. You and your healthcare provider can work together to create a plan for how to handle shortness of breath. The plan can include daily activities, treatment changes, and what to do if you have severe breathing problems.      Lean forward on your elbows when you sit. This helps your lungs expand and may make it easier to breathe.      Use pursed-lip breathing any time you feel short of breath. Breathe in through your nose and then slowly breathe out through your mouth with your lips slightly puckered. It should take you twice as long to breathe out as it did to breathe in.Breathe in Breathe out           Do not smoke. Nicotine and other chemicals in cigarettes and cigars can cause lung damage and make shortness of breath worse. Ask your healthcare provider for information if you currently smoke and need help to quit. E-cigarettes or smokeless tobacco still contain nicotine. Talk to your healthcare provider before you use these products.      Reach or maintain a healthy weight. Your healthcare provider can help you create a safe weight loss plan if you are overweight.      Exercise as directed. Exercise can help your lungs work more easily. Exercise can also help you lose weight if needed. Try to get at least 30 minutes of exercise most days of the week.    Follow up with your healthcare provider or specialist as directed: Write down your questions so you remember to ask them during your visits.         FOLLOW UP WITH YOUR PRIMARY DOCTOR IN 2-3 DAYS  IBUPROFEN 600MG EVERY 6 HOURS FOR PAIN  RETURN TO ER FOR NEW OR WORSENING SYMPTOMS

## 2021-05-22 NOTE — ED ADULT NURSE NOTE - CHIEF COMPLAINT QUOTE
Pt. BIBA for worsening back and SOB over the last week. Pt. states that at 6 am she woke up and the pain became bad with SOB and having left sided pain by her breast, with mild chest pain. Pt. reports back injury while lifting boxes.  Pt. has hx of asthma.

## 2021-05-22 NOTE — ED ADULT TRIAGE NOTE - CHIEF COMPLAINT QUOTE
Pt. BIBA for worsening back and chest pain over the last week. Pt. states that at 6 am she woke up and the pain became bad with SOB and having left sided pain by her breast. Pt. has hx of asthma. Pt. BIBA for worsening back and SOB over the last week. Pt. states that at 6 am she woke up and the pain became bad with SOB and having left sided pain by her breast, with mild chest pain. Pt. reports back injury while lifting boxes.  Pt. has hx of asthma.

## 2021-05-22 NOTE — ED PROVIDER NOTE - OBJECTIVE STATEMENT
55 yo female with hx asthma c/o pain to left back and left chest/ribs x few weeks. reports pain after living boxes. also c/o sob. denies nausea or vomiting.   denies leg pain or swelling  denies recent travel  +covid vaccine  denies abdominal pain

## 2021-05-22 NOTE — ED ADULT NURSE NOTE - OBJECTIVE STATEMENT
Pt with hx of asthma presents with audible wheezing and c/o pleuritic chest and back pains. Pt coughing and anxious during assessment but able to be calmed down. Pt states the SOB started today and her last asthma exacerbation 20 years ago.

## 2021-05-22 NOTE — ED PROVIDER NOTE - PATIENT PORTAL LINK FT
You can access the FollowMyHealth Patient Portal offered by [facility] by registering at the following website: [email or web address]. By joining [employer]’s FollowMyHealth portal, you will also be able to view your health information using other applications (apps) compatible with our system.

## 2021-05-26 ENCOUNTER — APPOINTMENT (OUTPATIENT)
Dept: FAMILY MEDICINE | Facility: CLINIC | Age: 56
End: 2021-05-26
Payer: MEDICARE

## 2021-05-26 ENCOUNTER — LABORATORY RESULT (OUTPATIENT)
Age: 56
End: 2021-05-26

## 2021-05-26 VITALS
BODY MASS INDEX: 32.82 KG/M2 | TEMPERATURE: 97.9 F | HEIGHT: 65 IN | DIASTOLIC BLOOD PRESSURE: 120 MMHG | SYSTOLIC BLOOD PRESSURE: 180 MMHG | HEART RATE: 88 BPM | WEIGHT: 197 LBS

## 2021-05-26 VITALS — DIASTOLIC BLOOD PRESSURE: 80 MMHG | SYSTOLIC BLOOD PRESSURE: 120 MMHG

## 2021-05-26 PROCEDURE — 99213 OFFICE O/P EST LOW 20 MIN: CPT | Mod: 25

## 2021-05-26 PROCEDURE — 36415 COLL VENOUS BLD VENIPUNCTURE: CPT

## 2021-05-27 PROBLEM — J45.909 UNSPECIFIED ASTHMA, UNCOMPLICATED: Chronic | Status: ACTIVE | Noted: 2021-05-22

## 2021-05-27 LAB — B BURGDOR IGG+IGM SER QL IB: NORMAL

## 2021-05-28 LAB

## 2021-05-29 NOTE — HISTORY OF PRESENT ILLNESS
[FreeTextEntry1] : TICK BITE [de-identified] : MS. WESTFALL IS A PLEASANT 55 YO PRESENTING FOR FOLLOW-UP.  WENT TO [facility] 5/22/21 FOR BACK AND CHEST PAIN.  WAS MOVING AND UNPACKING AND LIFTING CASES OF WATER AND SODA.  HAD LAB WORK, EKG, TA CHEST AND CHEST XARY .  ALSO HAD EKG.  GIVEN TORADOL AND A STEROID.  IS USING A HEATING PAD.  IT IS NOW BETTER BUT SORE  STILL FINE WITH MOVEMENT WA DISCOMFORT.  TAKING MOTRIN.  PRIOR TICK BITE YEARS AGO.  NO OTHER COMPLAINTS

## 2021-05-29 NOTE — PHYSICAL EXAM
[No Acute Distress] : no acute distress [Well Nourished] : well nourished [Well-Appearing] : well-appearing [No Respiratory Distress] : no respiratory distress  [No Accessory Muscle Use] : no accessory muscle use [Clear to Auscultation] : lungs were clear to auscultation bilaterally [Normal Rate] : normal rate  [Regular Rhythm] : with a regular rhythm [Normal S1, S2] : normal S1 and S2 [No CVA Tenderness] : no CVA  tenderness [No Spinal Tenderness] : no spinal tenderness [No Joint Swelling] : no joint swelling [Grossly Normal Strength/Tone] : grossly normal strength/tone [No Skin Lesions] : no skin lesions [Acne] : acne [Coordination Grossly Intact] : coordination grossly intact [No Focal Deficits] : no focal deficits [Normal Gait] : normal gait [Deep Tendon Reflexes (DTR)] : deep tendon reflexes were 2+ and symmetric

## 2021-05-29 NOTE — PLAN
[FreeTextEntry1] : STOP MOTRIN\par RX NAPROXEN - TO EAT WITH MEDICATION\par LIGHT STRETCHING\par FOLLOW-UP ALL SPECIALISTS AS DIRECTED \par WILL SEND FOR LYME TESTING\par DISCUSSED TICK AVOIDANCE AND PROTECTION\par CALL WITH ANY QUESTIONS, CONCERNS OR CHANGES

## 2021-06-07 ENCOUNTER — NON-APPOINTMENT (OUTPATIENT)
Age: 56
End: 2021-06-07

## 2021-06-09 ENCOUNTER — RX RENEWAL (OUTPATIENT)
Age: 56
End: 2021-06-09

## 2021-06-14 ENCOUNTER — TRANSCRIPTION ENCOUNTER (OUTPATIENT)
Age: 56
End: 2021-06-14

## 2021-06-14 ENCOUNTER — APPOINTMENT (OUTPATIENT)
Dept: FAMILY MEDICINE | Facility: CLINIC | Age: 56
End: 2021-06-14
Payer: MEDICARE

## 2021-06-14 VITALS
WEIGHT: 197 LBS | TEMPERATURE: 97.8 F | HEART RATE: 80 BPM | BODY MASS INDEX: 32.82 KG/M2 | SYSTOLIC BLOOD PRESSURE: 192 MMHG | HEIGHT: 65 IN | DIASTOLIC BLOOD PRESSURE: 108 MMHG

## 2021-06-14 PROCEDURE — 99214 OFFICE O/P EST MOD 30 MIN: CPT

## 2021-06-14 RX ORDER — NAPROXEN 500 MG/1
500 TABLET ORAL
Qty: 14 | Refills: 0 | Status: ACTIVE | COMMUNITY
Start: 2021-05-26 | End: 1900-01-01

## 2021-06-16 NOTE — ASSESSMENT
[FreeTextEntry1] : GERALD WESTFALL is a 56 year old female patient presenting to the clinic today for muscle spasm. \par \par # muscle spasm on the left side of her back

## 2021-06-16 NOTE — PLAN
[FreeTextEntry1] : # advise not to do an strenuous work\par # advise to use a heating pad \par # advise to see a chiropractor \par # referral to Musculoskeletal Rehab Therapy\par # Renew Rx Naproxen 500 MG for muscle spasm \par # advise to only take Tylenol if needed\par # advise not to take Aleve 200 MG\par # f/u if symptom worsens

## 2021-06-16 NOTE — END OF VISIT
[FreeTextEntry3] : This note was written by Eloisa King on 06/14/2021, acting as a scribe for Dr. Marcus MD.\par \par All medical record entries were at my, Dr. Estee Velazquez MD, direction and personally dictated by me in 06/14/2021. I have personally reviewed the chart and agree that the record accurately reflects my personal performance of the history, physical exam, assessment, and plan.\par

## 2021-06-16 NOTE — HISTORY OF PRESENT ILLNESS
[FreeTextEntry8] : GERALD WESTFALL is a 56 year old female patient presenting to the clinic today for muscle spasm. She went to the hospital for muscle spasm on the left side of her back and was prescribed the medicine Toradol, slight relief. She states she was moving and unpacking in her apartment yesterday and the pain came back.  She states it hurts to breathe and cough. Patient boyfriend bought her over the counter Aleve 200 MG, no relief. She has not seen a chiropractor or have done physical therapy  \par \par \par

## 2021-06-17 ENCOUNTER — APPOINTMENT (OUTPATIENT)
Dept: FAMILY MEDICINE | Facility: CLINIC | Age: 56
End: 2021-06-17
Payer: MEDICARE

## 2021-06-17 VITALS
HEIGHT: 65 IN | BODY MASS INDEX: 33.49 KG/M2 | SYSTOLIC BLOOD PRESSURE: 146 MMHG | OXYGEN SATURATION: 98 % | WEIGHT: 201 LBS | DIASTOLIC BLOOD PRESSURE: 96 MMHG | RESPIRATION RATE: 16 BRPM | HEART RATE: 91 BPM
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
## 2021-06-17 DIAGNOSIS — M54.9 DORSALGIA, UNSPECIFIED: ICD-10-CM

## 2021-06-17 PROCEDURE — 99213 OFFICE O/P EST LOW 20 MIN: CPT

## 2021-06-17 RX ORDER — MELOXICAM 15 MG/1
15 TABLET ORAL DAILY
Qty: 14 | Refills: 0 | Status: DISCONTINUED | COMMUNITY
Start: 2020-11-24 | End: 2021-06-17

## 2021-06-17 NOTE — PLAN
[FreeTextEntry1] : AVOID HEAVY LIFTING OR ACTIVITIES CAUSING PAIN\par LIGHT STRETCHING\par TO START PHYSICAL THERAPY\par NAPROXEN 500 MG BID PRN AS DIRECTED; TO EAT WITH MEDICATION\par TO CALL IF SYMPTOMS PERSIST/WORSEN\par CALL WITH ANY QUESTIONS, CONCERNS OR CHANGES \par COVERING PMD NOTE APPRECIATED\par MAY LAB WORK REVIEWED

## 2021-06-17 NOTE — REVIEW OF SYSTEMS
[Muscle Pain] : muscle pain [Back Pain] : back pain [Negative] : Neurological [Joint Pain] : no joint pain

## 2021-06-17 NOTE — PHYSICAL EXAM
[No Acute Distress] : no acute distress [Well Nourished] : well nourished [Well-Appearing] : well-appearing [No Respiratory Distress] : no respiratory distress  [No Accessory Muscle Use] : no accessory muscle use [Clear to Auscultation] : lungs were clear to auscultation bilaterally [Normal Rate] : normal rate  [Regular Rhythm] : with a regular rhythm [Normal S1, S2] : normal S1 and S2 [No CVA Tenderness] : no CVA  tenderness [No Spinal Tenderness] : no spinal tenderness [Grossly Normal Strength/Tone] : grossly normal strength/tone [Coordination Grossly Intact] : coordination grossly intact [No Focal Deficits] : no focal deficits [Normal Gait] : normal gait [Deep Tendon Reflexes (DTR)] : deep tendon reflexes were 2+ and symmetric

## 2021-06-21 ENCOUNTER — TRANSCRIPTION ENCOUNTER (OUTPATIENT)
Age: 56
End: 2021-06-21

## 2021-06-21 ENCOUNTER — NON-APPOINTMENT (OUTPATIENT)
Age: 56
End: 2021-06-21

## 2021-06-21 ENCOUNTER — APPOINTMENT (OUTPATIENT)
Dept: FAMILY MEDICINE | Facility: CLINIC | Age: 56
End: 2021-06-21
Payer: MEDICARE

## 2021-06-21 VITALS
SYSTOLIC BLOOD PRESSURE: 135 MMHG | OXYGEN SATURATION: 98 % | HEIGHT: 65 IN | BODY MASS INDEX: 34.16 KG/M2 | DIASTOLIC BLOOD PRESSURE: 95 MMHG | HEART RATE: 98 BPM | RESPIRATION RATE: 16 BRPM | WEIGHT: 205 LBS

## 2021-06-21 DIAGNOSIS — I78.1 NEVUS, NON-NEOPLASTIC: ICD-10-CM

## 2021-06-21 DIAGNOSIS — I83.90 ASYMPTOMATIC VARICOSE VEINS OF UNSPECIFIED LOWER EXTREMITY: ICD-10-CM

## 2021-06-21 DIAGNOSIS — R60.0 LOCALIZED EDEMA: ICD-10-CM

## 2021-06-21 PROCEDURE — 93000 ELECTROCARDIOGRAM COMPLETE: CPT

## 2021-06-21 PROCEDURE — 99214 OFFICE O/P EST MOD 30 MIN: CPT | Mod: 25

## 2021-06-21 NOTE — PHYSICAL EXAM
[No Acute Distress] : no acute distress [Well Nourished] : well nourished [Well-Appearing] : well-appearing [No Lymphadenopathy] : no lymphadenopathy [Supple] : supple [No Respiratory Distress] : no respiratory distress  [No Accessory Muscle Use] : no accessory muscle use [Clear to Auscultation] : lungs were clear to auscultation bilaterally [Normal Rate] : normal rate  [Regular Rhythm] : with a regular rhythm [Normal S1, S2] : normal S1 and S2 [Grossly Normal Strength/Tone] : grossly normal strength/tone [Speech Grossly Normal] : speech grossly normal [Normal Affect] : the affect was normal [Normal Mood] : the mood was normal [de-identified] : DISTAL PULSES INTACT.  SPIDER AND VARICOSE VEINS, NON-TENDER [de-identified] : TRACE PEDAL EDEMA BILATERALLY WITHOUT PITTING

## 2021-06-21 NOTE — HISTORY OF PRESENT ILLNESS
[FreeTextEntry8] : MS. WESTFALL IS A PLEASANT 55 YO PRESENTING FOR ACUTE VISIT.  HAS NOTED ANKLE SWELLING SINCE YESTERDAY.  "PUFFY".  SAW HER PODIATRIST THIS MORNING.  HAD NAILS TRIMMED.  HAD "PRESSURE MEASURED" IN FEET AND ADVISED OKAY.  GIVEN CREAM FOR "ITCHY FEET".  HAS HAD NO TRAUMA.  NO PRIOR EPISODE IN THE PAST.  NO CHEST PAIN.  HAS BEEN EATING OUT A LOT INCLUDING FRENCH FRIES OVER THE WEEKEND.  WAS ON HER FEET THIS WEEKEND AS WELL PER HER BOYFRIEND REPORT WHO IS PRESENT TODAY.  DENIES CHEST PAIN OR SHORTNESS OF BREATH. PLANS TO START PT FOR BACK.

## 2021-06-21 NOTE — PLAN
[FreeTextEntry1] : SODIUM AVOIDANCE STRESSED\par ELEVATION OF LEGS\par TO HOLD NAPROXEN\par EKG: NSR AT 75 BPM, NO ACUTE ST-T WAVE CHANGES; COMPARED TO PRIOR\par VASCULAR EVALUATION FOR VARICOSE VEINS\par AGREES TO ADDITIONAL TESTING/IMAGING IF NO IMPROVEMENT/WORSENING\par CARDIO FOR SCREENING\par RECENT LAB WORK REVIEWED AS WELL AS PRIOR NOTE; TO GO FOR CMP, TFT'S AND CBC FOR ENDOCRINOLOGY\par WILL MONITOR BLOOD PRESSURE\par CALL WITH ANY QUESTIONS, CONCERNS OR CHANGES

## 2021-06-21 NOTE — REVIEW OF SYSTEMS
[Muscle Pain] : muscle pain [Negative] : Heme/Lymph [Chest Pain] : no chest pain [Palpitations] : no palpitations [FreeTextEntry9] : SEE HPI

## 2021-06-22 ENCOUNTER — APPOINTMENT (OUTPATIENT)
Dept: RADIOLOGY | Facility: CLINIC | Age: 56
End: 2021-06-22
Payer: MEDICARE

## 2021-06-22 ENCOUNTER — OUTPATIENT (OUTPATIENT)
Dept: OUTPATIENT SERVICES | Facility: HOSPITAL | Age: 56
LOS: 1 days | End: 2021-06-22
Payer: MEDICARE

## 2021-06-22 DIAGNOSIS — Z00.00 ENCOUNTER FOR GENERAL ADULT MEDICAL EXAMINATION WITHOUT ABNORMAL FINDINGS: ICD-10-CM

## 2021-06-22 DIAGNOSIS — R05 COUGH: ICD-10-CM

## 2021-06-22 PROCEDURE — 71046 X-RAY EXAM CHEST 2 VIEWS: CPT | Mod: 26

## 2021-06-22 PROCEDURE — 71046 X-RAY EXAM CHEST 2 VIEWS: CPT

## 2021-06-29 ENCOUNTER — NON-APPOINTMENT (OUTPATIENT)
Age: 56
End: 2021-06-29

## 2021-07-02 ENCOUNTER — NON-APPOINTMENT (OUTPATIENT)
Age: 56
End: 2021-07-02

## 2021-07-07 ENCOUNTER — APPOINTMENT (OUTPATIENT)
Dept: FAMILY MEDICINE | Facility: CLINIC | Age: 56
End: 2021-07-07

## 2021-08-25 ENCOUNTER — APPOINTMENT (OUTPATIENT)
Dept: ORTHOPEDIC SURGERY | Facility: CLINIC | Age: 56
End: 2021-08-25
Payer: MEDICARE

## 2021-08-25 VITALS
BODY MASS INDEX: 34.16 KG/M2 | HEART RATE: 90 BPM | HEIGHT: 65 IN | WEIGHT: 205 LBS | SYSTOLIC BLOOD PRESSURE: 166 MMHG | DIASTOLIC BLOOD PRESSURE: 97 MMHG

## 2021-08-25 DIAGNOSIS — M67.432 GANGLION, LEFT WRIST: ICD-10-CM

## 2021-08-25 PROCEDURE — 99213 OFFICE O/P EST LOW 20 MIN: CPT

## 2021-08-25 PROCEDURE — 73110 X-RAY EXAM OF WRIST: CPT | Mod: LT

## 2021-08-27 NOTE — HISTORY OF PRESENT ILLNESS
[FreeTextEntry1] : 08/25/2021\par Ms. GERALD WESTFALL returns for followup of the left volar wrist ganglion. She never went to ultrasound guided aspiration. According to her counselor, the ganglion has not changed in size in the past few months. She denies persistent pain, but endorses occasional discomfort with activities.\par \par 11/24/2020\par Ms. GERALD WESTFALL is a pleasant 55 year old female, RHD, supermarket and .\par \par She presents to the office with her counselor for evaluation of left wrist lump.\par She has had these symptoms for more than 3 weeks.\par She denies fevers or chills, denies constitutional symptoms. \par \par She denies prior injury.\par Currently her pain is intermittent, achy, without radiation.\par She denies paresthesia.\par She denies night symptoms.\par Symptoms improve with rest and immobilization.\par Symptoms worsen with activity.\par \par She is not diabetic.\par She has a history of hypothyroidism.\par She denies current tobacco use.\par She denies recreational drug use.\par She does not take any narcotic pain medication.\par

## 2021-08-27 NOTE — PHYSICAL EXAM
[de-identified] : GENERAL: Awake, alert, cooperative, answers questions appropriately. No acute distress.  Ambulates independently with a normal gait.\par SKIN: Warm, dry, intact. Color and turgor normal. \par LUNGS: Demonstrates unlabored breathing on room air with no accessory muscle use.\par EXTREMITIES: Warm and well-perfused. \par NEUROLOGICAL: Grossly intact.\par \par FOCUSED UPPER EXTREMITY EXAM: \par \par LUE:\par -skin intact without any erythema, or ecchymosis, or joint swelling; normal finger cascade without malrotation\par -palpable soft mass at volar radial wrist near the wrist crease, about 10 x 15 mm; mass more prominent with wrist extension\par -mild tenderness to palpation over the mass, negative Tinel's over the mass, no palpable pulsation of the mass\par -no thenar atrophy; no intrinsics wasting; 5/5 strength thumb opposition; 5/5 strength intrinsics\par -no tenderness to palpation along first dorsal wrist compartment\par -no tenderness to palpation at the base of the thumb\par -no tenderness to palpation at the anatomic snuffbox\par -no pain with passive thumb circumduction with negative grind test\par -no pain with resisted thumb extension with negative Finkelstein test\par -negative scaphoid shift with gentle passive motion\par -able to make full fist, free AROM in all fingers, no scissoring\par -full wrist ROM; full forearm supination/pronation; no ECU subluxation; DRUJ stable and nontender\par -sensation intact in radial, ulnar, and median nerve distributions\par -Brisk capillary refill, fingers warm well perfused; Karthik's test normal\par   [de-identified] : X-rays of left wrist (3 views) were obtained in Office today and reviewed with patient, showing no acute fracture or dislocation.  No bony or joint erosion.  No calcification within the soft tissue mass of concern.

## 2021-08-27 NOTE — DISCUSSION/SUMMARY
[FreeTextEntry1] : 56 year old female with left volar wrist soft tissue mass likely a ganglion\par \par -We discussed in detail the clinical findings\par -We discussed the pathophysiology and natural history of patient's condition; nonsurgical and surgical management options were discussed in detail, including observation, aspiration, versus surgical excision.\par -I recommended activity modification and bracing as needed for comfort.\par -we discussed that if the ganglion continue to increase in size or cause significant pain, I can send her for ultrasound guided aspiration.\par -She was advised to take over the counter medication for pain as needed if there is no contraindication.\par -I will see her as needed\par -Patient had the opportunity to ask questions and she was in agreement with the plan\par -Over 50% of the time spent with the patient was on counseling the patient on the above diagnosis, treatment plan and prognosis.\par

## 2021-08-30 ENCOUNTER — APPOINTMENT (OUTPATIENT)
Dept: CT IMAGING | Facility: CLINIC | Age: 56
End: 2021-08-30

## 2021-08-30 ENCOUNTER — OUTPATIENT (OUTPATIENT)
Dept: OUTPATIENT SERVICES | Facility: HOSPITAL | Age: 56
LOS: 1 days | End: 2021-08-30

## 2021-08-30 DIAGNOSIS — Z00.8 ENCOUNTER FOR OTHER GENERAL EXAMINATION: ICD-10-CM

## 2021-09-01 ENCOUNTER — RX RENEWAL (OUTPATIENT)
Age: 56
End: 2021-09-01

## 2021-09-03 ENCOUNTER — APPOINTMENT (OUTPATIENT)
Dept: FAMILY MEDICINE | Facility: CLINIC | Age: 56
End: 2021-09-03

## 2021-10-26 ENCOUNTER — APPOINTMENT (OUTPATIENT)
Dept: DERMATOLOGY | Facility: CLINIC | Age: 56
End: 2021-10-26

## 2021-11-16 ENCOUNTER — APPOINTMENT (OUTPATIENT)
Dept: FAMILY MEDICINE | Facility: CLINIC | Age: 56
End: 2021-11-16

## 2021-11-24 ENCOUNTER — APPOINTMENT (OUTPATIENT)
Dept: DERMATOLOGY | Facility: CLINIC | Age: 56
End: 2021-11-24

## 2021-12-06 ENCOUNTER — APPOINTMENT (OUTPATIENT)
Dept: ENDOCRINOLOGY | Facility: CLINIC | Age: 56
End: 2021-12-06

## 2021-12-07 ENCOUNTER — RX RENEWAL (OUTPATIENT)
Age: 56
End: 2021-12-07

## 2022-05-26 ENCOUNTER — NON-APPOINTMENT (OUTPATIENT)
Age: 57
End: 2022-05-26

## 2023-01-26 ENCOUNTER — NON-APPOINTMENT (OUTPATIENT)
Age: 58
End: 2023-01-26

## 2023-01-27 ENCOUNTER — EMERGENCY (EMERGENCY)
Facility: HOSPITAL | Age: 58
LOS: 1 days | Discharge: HOME | End: 2023-01-27
Attending: EMERGENCY MEDICINE
Payer: MEDICARE

## 2023-01-27 VITALS
HEIGHT: 65 IN | HEART RATE: 94 BPM | TEMPERATURE: 98 F | DIASTOLIC BLOOD PRESSURE: 54 MMHG | WEIGHT: 210.1 LBS | RESPIRATION RATE: 16 BRPM | OXYGEN SATURATION: 99 % | SYSTOLIC BLOOD PRESSURE: 113 MMHG

## 2023-01-27 LAB
ALBUMIN SERPL ELPH-MCNC: 4.3 G/DL — SIGNIFICANT CHANGE UP (ref 3.3–5.2)
ALP SERPL-CCNC: 88 U/L — SIGNIFICANT CHANGE UP (ref 40–120)
ALT FLD-CCNC: 84 U/L — HIGH
ANION GAP SERPL CALC-SCNC: 12 MMOL/L — SIGNIFICANT CHANGE UP (ref 5–17)
APTT BLD: 55.3 SEC — HIGH (ref 27.5–35.5)
AST SERPL-CCNC: 68 U/L — HIGH
BASOPHILS # BLD AUTO: 0.1 K/UL — SIGNIFICANT CHANGE UP (ref 0–0.2)
BASOPHILS NFR BLD AUTO: 1 % — SIGNIFICANT CHANGE UP (ref 0–2)
BILIRUB SERPL-MCNC: <0.2 MG/DL — LOW (ref 0.4–2)
BUN SERPL-MCNC: 19.9 MG/DL — SIGNIFICANT CHANGE UP (ref 8–20)
CALCIUM SERPL-MCNC: 9.1 MG/DL — SIGNIFICANT CHANGE UP (ref 8.4–10.5)
CHLORIDE SERPL-SCNC: 102 MMOL/L — SIGNIFICANT CHANGE UP (ref 96–108)
CK SERPL-CCNC: 107 U/L — SIGNIFICANT CHANGE UP (ref 25–170)
CO2 SERPL-SCNC: 24 MMOL/L — SIGNIFICANT CHANGE UP (ref 22–29)
CREAT SERPL-MCNC: 1.23 MG/DL — SIGNIFICANT CHANGE UP (ref 0.5–1.3)
EGFR: 51 ML/MIN/1.73M2 — LOW
EOSINOPHIL # BLD AUTO: 0.53 K/UL — HIGH (ref 0–0.5)
EOSINOPHIL NFR BLD AUTO: 5.3 % — SIGNIFICANT CHANGE UP (ref 0–6)
GLUCOSE SERPL-MCNC: 112 MG/DL — HIGH (ref 70–99)
HCT VFR BLD CALC: 37.7 % — SIGNIFICANT CHANGE UP (ref 34.5–45)
HGB BLD-MCNC: 12.5 G/DL — SIGNIFICANT CHANGE UP (ref 11.5–15.5)
IMM GRANULOCYTES NFR BLD AUTO: 0.3 % — SIGNIFICANT CHANGE UP (ref 0–0.9)
INR BLD: 4.04 RATIO — HIGH (ref 0.88–1.16)
LYMPHOCYTES # BLD AUTO: 2.4 K/UL — SIGNIFICANT CHANGE UP (ref 1–3.3)
LYMPHOCYTES # BLD AUTO: 24.2 % — SIGNIFICANT CHANGE UP (ref 13–44)
MCHC RBC-ENTMCNC: 29.7 PG — SIGNIFICANT CHANGE UP (ref 27–34)
MCHC RBC-ENTMCNC: 33.2 GM/DL — SIGNIFICANT CHANGE UP (ref 32–36)
MCV RBC AUTO: 89.5 FL — SIGNIFICANT CHANGE UP (ref 80–100)
MONOCYTES # BLD AUTO: 0.73 K/UL — SIGNIFICANT CHANGE UP (ref 0–0.9)
MONOCYTES NFR BLD AUTO: 7.4 % — SIGNIFICANT CHANGE UP (ref 2–14)
NEUTROPHILS # BLD AUTO: 6.12 K/UL — SIGNIFICANT CHANGE UP (ref 1.8–7.4)
NEUTROPHILS NFR BLD AUTO: 61.8 % — SIGNIFICANT CHANGE UP (ref 43–77)
PLATELET # BLD AUTO: 337 K/UL — SIGNIFICANT CHANGE UP (ref 150–400)
POTASSIUM SERPL-MCNC: 4.3 MMOL/L — SIGNIFICANT CHANGE UP (ref 3.5–5.3)
POTASSIUM SERPL-SCNC: 4.3 MMOL/L — SIGNIFICANT CHANGE UP (ref 3.5–5.3)
PROT SERPL-MCNC: 7.3 G/DL — SIGNIFICANT CHANGE UP (ref 6.6–8.7)
PROTHROM AB SERPL-ACNC: 47.5 SEC — HIGH (ref 10.5–13.4)
RBC # BLD: 4.21 M/UL — SIGNIFICANT CHANGE UP (ref 3.8–5.2)
RBC # FLD: 13.4 % — SIGNIFICANT CHANGE UP (ref 10.3–14.5)
SODIUM SERPL-SCNC: 138 MMOL/L — SIGNIFICANT CHANGE UP (ref 135–145)
WBC # BLD: 9.91 K/UL — SIGNIFICANT CHANGE UP (ref 3.8–10.5)
WBC # FLD AUTO: 9.91 K/UL — SIGNIFICANT CHANGE UP (ref 3.8–10.5)

## 2023-01-27 PROCEDURE — 85610 PROTHROMBIN TIME: CPT

## 2023-01-27 PROCEDURE — 36415 COLL VENOUS BLD VENIPUNCTURE: CPT

## 2023-01-27 PROCEDURE — 93971 EXTREMITY STUDY: CPT

## 2023-01-27 PROCEDURE — 93971 EXTREMITY STUDY: CPT | Mod: 26,RT

## 2023-01-27 PROCEDURE — 99284 EMERGENCY DEPT VISIT MOD MDM: CPT | Mod: FS

## 2023-01-27 PROCEDURE — 80053 COMPREHEN METABOLIC PANEL: CPT

## 2023-01-27 PROCEDURE — 85730 THROMBOPLASTIN TIME PARTIAL: CPT

## 2023-01-27 PROCEDURE — 82550 ASSAY OF CK (CPK): CPT

## 2023-01-27 PROCEDURE — 99284 EMERGENCY DEPT VISIT MOD MDM: CPT | Mod: 25

## 2023-01-27 PROCEDURE — 85025 COMPLETE CBC W/AUTO DIFF WBC: CPT

## 2023-01-27 RX ORDER — ACETAMINOPHEN 500 MG
975 TABLET ORAL ONCE
Refills: 0 | Status: COMPLETED | OUTPATIENT
Start: 2023-01-27 | End: 2023-01-27

## 2023-01-27 RX ADMIN — Medication 975 MILLIGRAM(S): at 19:33

## 2023-01-27 NOTE — ED STATDOCS - PROGRESS NOTE DETAILS
PT evaluated by intake physician. HPI/PE/ROS as noted above. Will follow up plan per intake physician. Labs explained to pt. Explained to pt that PT was elevated, to skip 1 day of warfarin and then resume on Sunday. explained f/u with vascular specialist on Monday. Pt states she has an appt already scheduled. strict return precautions explained. Pt reassessed, pt feeling better at this time, vss, pt able to walk, talk and vocalized plan of action. Discussed in depth and explained to pt in depth the next steps that need to be taking including proper follow up with PCP or specialists. All incidental findings were discussed with pt as well. Pt verbalized their concerns and all questions were answered. Pt understands dispo and wants discharge. Given good instructions when to return to ED, strict return precautions and importance of f/u.

## 2023-01-27 NOTE — ED STATDOCS - MUSCULOSKELETAL, MLM
range of motion is not limited and there is no muscle tenderness. Right LE swelling with varicose veins.

## 2023-01-27 NOTE — ED STATDOCS - CARE PROVIDER_API CALL
Gatito Nleson)  Surgery; Vascular Surgery  [address] [place] [address]  Phone: [phone]  Fax: [phone]  Follow Up Time:

## 2023-01-27 NOTE — ED STATDOCS - OBJECTIVE STATEMENT
58 y/o female with PMHx of asthma presents with leg pain. Pt had blood clot Dx on Wednesday at [facility], put on Coumadin. Blood clot was in right leg. Pt now has bilateral leg pain; started yesterday. Pain is mainly in the thighs with possible swelling to entire LE. Her right leg aches more than the left. No chest pain, SOB, fevers or chills. No other complaints at this time. Pt has a known Hx of blood clots.

## 2023-01-27 NOTE — ED ADULT TRIAGE NOTE - CHIEF COMPLAINT QUOTE
Pt with b/l thigh pain and left lower leg pain since last night. Pt was diagnosed with a DVT last week at the cardiologist office and she was placed on Lovenox and her Coumadin was increased. Pt is unsure which leg the clot was in.

## 2023-01-27 NOTE — ED STATDOCS - PATIENT PORTAL LINK FT
You can access the FollowMyHealth Patient Portal offered by [facility] by registering at the following website: [email or web address]. By joining [employer]’s FollowMyHealth portal, you will also be able to view your health information using other applications (apps) compatible with our system.

## 2023-01-27 NOTE — ED STATDOCS - NSFOLLOWUPINSTRUCTIONS_ED_ALL_ED_FT
- Please follow-up with your primary care doctor in the next 1-2 days.  Please call tomorrow for an appointment.  If you cannot follow-up with your primary care doctor please return to the ED for any urgent issues.  - You were given a copy of the tests performed today.  Please bring the results with you and review them with your primary care doctor.  - If you have any worsening of symptoms or any other concerns please return to the ED immediately.  - Please continue taking your home medications as directed.   - Follow up with the vascular specialist within 1-2 days.

## 2023-01-27 NOTE — ED STATDOCS - CLINICAL SUMMARY MEDICAL DECISION MAKING FREE TEXT BOX
Pt with known blood clots with increased swelling and pain on warfarin, will check labs, reassess. Pt with known blood clots with increased swelling and pain after standing on feet  on warfarin, will check labs, reassess.

## 2023-01-27 NOTE — ED ADULT NURSE NOTE - OBJECTIVE STATEMENT
PT A&Ox4. Pt stated that she started having leg pain in bilateral legs and stated that at her primary MD that she was told that she has a blood clot.  PT stated that her medications was changed last week Wednesday from Plavix to coumadin by her primary MD and stated that she started to experience leg pain yesterday and decided to come into ER.  Pt stated that she went to urgent care today and was told that her legs were swollen. Pt stated that she has a hx of asthma and a previous blood clot. Will continue to monitor,.

## 2023-02-14 ENCOUNTER — APPOINTMENT (OUTPATIENT)
Dept: VASCULAR SURGERY | Facility: CLINIC | Age: 58
End: 2023-02-14
Payer: MEDICARE

## 2023-02-14 VITALS
HEART RATE: 77 BPM | BODY MASS INDEX: 34.16 KG/M2 | RESPIRATION RATE: 16 BRPM | OXYGEN SATURATION: 96 % | SYSTOLIC BLOOD PRESSURE: 122 MMHG | WEIGHT: 205 LBS | DIASTOLIC BLOOD PRESSURE: 77 MMHG | TEMPERATURE: 96 F | HEIGHT: 65 IN

## 2023-02-14 DIAGNOSIS — I83.90 ASYMPTOMATIC VARICOSE VEINS OF UNSPECIFIED LOWER EXTREMITY: ICD-10-CM

## 2023-02-14 DIAGNOSIS — I82.501 CHRONIC EMBOLISM AND THROMBOSIS OF UNSPECIFIED DEEP VEINS OF RIGHT LOWER EXTREMITY: ICD-10-CM

## 2023-02-14 PROCEDURE — 99214 OFFICE O/P EST MOD 30 MIN: CPT

## 2023-02-14 NOTE — ASSESSMENT
[FreeTextEntry1] : 57-year-old female with history of DVT factor V Leiden has been on chronic anticoagulation.  It is difficult to tell whether she failed Eliquis treatment and truly had a new DVT 1 month ago versus findings of chronic venous changes.  That being said she is tolerating Coumadin.  I would defer any choice of anticoagulation to her hematologist.  In regards to the heaviness at the end of the day and intermittent pain in her legs I have advised her that it would be very unlikely for her to have a recurrent DVT.  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  This was gently close the heaviness in the day and the intermittent pain particularly at the end of the day when she is feeling.  I advised her if her swelling gets worse compared to her baseline then she should seek evaluation for possible ultrasound otherwise I would not go to the emergency room with every lower extremity pain.  I have reassured her.  I advised her to wear compression stockings during at least workdays.  She was provided with prescription for compression stockings.  She can follow-up as needed.

## 2023-02-14 NOTE — HISTORY OF PRESENT ILLNESS
[FreeTextEntry1] : Pleasant 57-year-old female.  History of factor V Leiden had a trauma some years ago and suffered a DVT with pulmonary embolism.  Since she has been on Eliquis.  About 1 month ago she was at her cardiologist office there is a question on whether she had pain and/or swelling at that time and underwent a right lower extremity duplex which showed evidence of deep vein thrombosis.  I do not have access to these images to review.  Given the possibility of failure of Eliquis she was switched to Coumadin.  She was most recently in the ER at [facility] and underwent a right lower extremity venous duplex which I did review and it did not show evidence of acute deep vein thrombosis.  At that time she had her INR was elevated she was having pain in both lower extremities and heaviness at the end of the day.  Her INR was leveled and now she reports doing well.  She is on her feet she works at a [employer] and is on her feet for about 6 hours a day.  At the end of the day she feels some pain and heaviness in both legs.  She does have a history of varicose veins.  She does not wear compression therapy.  She

## 2023-02-14 NOTE — PHYSICAL EXAM
[1+] : left 1+ [Ankle Swelling Bilaterally] : bilaterally  [Varicose Veins Of Lower Extremities] : bilaterally [] : not present [de-identified] : Initially appeared well, began to tear when discussing her symptoms. [de-identified] : Bilateral lower extremities with scattered areas of varicosities telangiectatic and spider veins.  There is mild swelling bilaterally.

## 2023-06-08 NOTE — ED ADULT NURSE NOTE - NSFALLRSKASSESSDT_ED_ALL_ED
22-May-2021 08:04 Libtayo Pregnancy And Lactation Text: This medication is contraindicated in pregnancy and when breast feeding.

## 2023-06-14 NOTE — PHYSICAL EXAM
[Normal] : normal rate, regular rhythm, normal S1 and S2 and no murmur heard [Coordination Grossly Intact] : coordination grossly intact [No Focal Deficits] : no focal deficits [Normal Gait] : normal gait [Speech Grossly Normal] : speech grossly normal [Normal Affect] : the affect was normal [Normal Mood] : the mood was normal [de-identified] : Vital signs reviewed, WNL Detail Level: Detailed

## 2024-03-12 ENCOUNTER — EMERGENCY (EMERGENCY)
Facility: HOSPITAL | Age: 59
LOS: 0 days | Discharge: HOME | End: 2024-03-12
Attending: EMERGENCY MEDICINE
Payer: MEDICARE

## 2024-03-12 VITALS — HEART RATE: 65 BPM | SYSTOLIC BLOOD PRESSURE: 102 MMHG | DIASTOLIC BLOOD PRESSURE: 67 MMHG

## 2024-03-12 VITALS — WEIGHT: 197.98 LBS

## 2024-03-12 DIAGNOSIS — M25.551 PAIN IN RIGHT HIP: ICD-10-CM

## 2024-03-12 DIAGNOSIS — Z88.1 ALLERGY STATUS TO OTHER ANTIBIOTIC AGENTS STATUS: ICD-10-CM

## 2024-03-12 DIAGNOSIS — Z88.0 ALLERGY STATUS TO PENICILLIN: ICD-10-CM

## 2024-03-12 DIAGNOSIS — M79.661 PAIN IN RIGHT LOWER LEG: ICD-10-CM

## 2024-03-12 LAB
ALBUMIN SERPL ELPH-MCNC: 3.8 G/DL — SIGNIFICANT CHANGE UP (ref 3.3–5)
ALP SERPL-CCNC: 85 U/L — SIGNIFICANT CHANGE UP (ref 40–120)
ALT FLD-CCNC: 15 U/L — SIGNIFICANT CHANGE UP (ref 12–78)
ANION GAP SERPL CALC-SCNC: 4 MMOL/L — LOW (ref 5–17)
APTT BLD: 35.2 SEC — SIGNIFICANT CHANGE UP (ref 24.5–35.6)
AST SERPL-CCNC: 15 U/L — SIGNIFICANT CHANGE UP (ref 15–37)
BASOPHILS # BLD AUTO: 0.08 K/UL — SIGNIFICANT CHANGE UP (ref 0–0.2)
BASOPHILS NFR BLD AUTO: 0.9 % — SIGNIFICANT CHANGE UP (ref 0–2)
BILIRUB SERPL-MCNC: 0.3 MG/DL — SIGNIFICANT CHANGE UP (ref 0.2–1.2)
BUN SERPL-MCNC: 24 MG/DL — HIGH (ref 7–23)
CALCIUM SERPL-MCNC: 10.4 MG/DL — HIGH (ref 8.5–10.1)
CHLORIDE SERPL-SCNC: 103 MMOL/L — SIGNIFICANT CHANGE UP (ref 96–108)
CO2 SERPL-SCNC: 27 MMOL/L — SIGNIFICANT CHANGE UP (ref 22–31)
CREAT SERPL-MCNC: 1.19 MG/DL — SIGNIFICANT CHANGE UP (ref 0.5–1.3)
EGFR: 52 ML/MIN/1.73M2 — LOW
EOSINOPHIL # BLD AUTO: 0.32 K/UL — SIGNIFICANT CHANGE UP (ref 0–0.5)
EOSINOPHIL NFR BLD AUTO: 3.5 % — SIGNIFICANT CHANGE UP (ref 0–6)
GLUCOSE SERPL-MCNC: 105 MG/DL — HIGH (ref 70–99)
HCT VFR BLD CALC: 38.2 % — SIGNIFICANT CHANGE UP (ref 34.5–45)
HGB BLD-MCNC: 12.7 G/DL — SIGNIFICANT CHANGE UP (ref 11.5–15.5)
IMM GRANULOCYTES NFR BLD AUTO: 0.3 % — SIGNIFICANT CHANGE UP (ref 0–0.9)
INR BLD: 1.48 RATIO — HIGH (ref 0.85–1.18)
LYMPHOCYTES # BLD AUTO: 2.26 K/UL — SIGNIFICANT CHANGE UP (ref 1–3.3)
LYMPHOCYTES # BLD AUTO: 24.9 % — SIGNIFICANT CHANGE UP (ref 13–44)
MCHC RBC-ENTMCNC: 29.8 PG — SIGNIFICANT CHANGE UP (ref 27–34)
MCHC RBC-ENTMCNC: 33.2 GM/DL — SIGNIFICANT CHANGE UP (ref 32–36)
MCV RBC AUTO: 89.7 FL — SIGNIFICANT CHANGE UP (ref 80–100)
MONOCYTES # BLD AUTO: 0.7 K/UL — SIGNIFICANT CHANGE UP (ref 0–0.9)
MONOCYTES NFR BLD AUTO: 7.7 % — SIGNIFICANT CHANGE UP (ref 2–14)
NEUTROPHILS # BLD AUTO: 5.67 K/UL — SIGNIFICANT CHANGE UP (ref 1.8–7.4)
NEUTROPHILS NFR BLD AUTO: 62.7 % — SIGNIFICANT CHANGE UP (ref 43–77)
PLATELET # BLD AUTO: 390 K/UL — SIGNIFICANT CHANGE UP (ref 150–400)
POTASSIUM SERPL-MCNC: 4.1 MMOL/L — SIGNIFICANT CHANGE UP (ref 3.5–5.3)
POTASSIUM SERPL-SCNC: 4.1 MMOL/L — SIGNIFICANT CHANGE UP (ref 3.5–5.3)
PROT SERPL-MCNC: 8 GM/DL — SIGNIFICANT CHANGE UP (ref 6–8.3)
PROTHROM AB SERPL-ACNC: 16.5 SEC — HIGH (ref 9.5–13)
RBC # BLD: 4.26 M/UL — SIGNIFICANT CHANGE UP (ref 3.8–5.2)
RBC # FLD: 13.6 % — SIGNIFICANT CHANGE UP (ref 10.3–14.5)
SODIUM SERPL-SCNC: 134 MMOL/L — LOW (ref 135–145)
WBC # BLD: 9.06 K/UL — SIGNIFICANT CHANGE UP (ref 3.8–10.5)
WBC # FLD AUTO: 9.06 K/UL — SIGNIFICANT CHANGE UP (ref 3.8–10.5)

## 2024-03-12 PROCEDURE — 80053 COMPREHEN METABOLIC PANEL: CPT

## 2024-03-12 PROCEDURE — 73552 X-RAY EXAM OF FEMUR 2/>: CPT | Mod: 26,RT

## 2024-03-12 PROCEDURE — 96375 TX/PRO/DX INJ NEW DRUG ADDON: CPT

## 2024-03-12 PROCEDURE — 36415 COLL VENOUS BLD VENIPUNCTURE: CPT

## 2024-03-12 PROCEDURE — 99284 EMERGENCY DEPT VISIT MOD MDM: CPT | Mod: 25

## 2024-03-12 PROCEDURE — 73503 X-RAY EXAM HIP UNI 4/> VIEWS: CPT | Mod: RT

## 2024-03-12 PROCEDURE — 85730 THROMBOPLASTIN TIME PARTIAL: CPT

## 2024-03-12 PROCEDURE — 73552 X-RAY EXAM OF FEMUR 2/>: CPT | Mod: RT

## 2024-03-12 PROCEDURE — 73503 X-RAY EXAM HIP UNI 4/> VIEWS: CPT | Mod: 26,RT

## 2024-03-12 PROCEDURE — 85025 COMPLETE CBC W/AUTO DIFF WBC: CPT

## 2024-03-12 PROCEDURE — 85610 PROTHROMBIN TIME: CPT

## 2024-03-12 PROCEDURE — 96374 THER/PROPH/DIAG INJ IV PUSH: CPT

## 2024-03-12 PROCEDURE — 99284 EMERGENCY DEPT VISIT MOD MDM: CPT | Mod: FS

## 2024-03-12 RX ORDER — ACETAMINOPHEN 500 MG
1000 TABLET ORAL ONCE
Refills: 0 | Status: COMPLETED | OUTPATIENT
Start: 2024-03-12 | End: 2024-03-12

## 2024-03-12 RX ORDER — OXYCODONE HYDROCHLORIDE 5 MG/1
1 TABLET ORAL
Qty: 6 | Refills: 0
Start: 2024-03-12 | End: 2024-03-13

## 2024-03-12 RX ORDER — LIDOCAINE 4 G/100G
1 CREAM TOPICAL ONCE
Refills: 0 | Status: COMPLETED | OUTPATIENT
Start: 2024-03-12 | End: 2024-03-12

## 2024-03-12 RX ORDER — MORPHINE SULFATE 50 MG/1
2 CAPSULE, EXTENDED RELEASE ORAL ONCE
Refills: 0 | Status: DISCONTINUED | OUTPATIENT
Start: 2024-03-12 | End: 2024-03-12

## 2024-03-12 RX ADMIN — Medication 400 MILLIGRAM(S): at 17:35

## 2024-03-12 RX ADMIN — MORPHINE SULFATE 2 MILLIGRAM(S): 50 CAPSULE, EXTENDED RELEASE ORAL at 17:36

## 2024-03-12 RX ADMIN — LIDOCAINE 1 PATCH: 4 CREAM TOPICAL at 17:40

## 2024-03-12 NOTE — ED STATDOCS - NS_ ATTENDINGSCRIBEDETAILS _ED_A_ED_FT
I, Thomas Chu MD,  performed the initial face to face bedside interview with this patient regarding history of present illness, review of symptoms and relevant past medical, social and family history.  I completed an independent physical examination.  I was the initial provider who evaluated this patient.   I personally saw the patient and performed a substantive portion of the visit including all aspects of the medical decision making.  The history, relevant review of systems, past medical and surgical history, medical decision making, and physical examination was documented by the scribe in my presence and I attest to the accuracy of the documentation.

## 2024-03-12 NOTE — ED STATDOCS - PHYSICAL EXAMINATION
Constitutional: NAD AAOx3  Eyes: PERRLA EOMI  Head: Normocephalic atraumatic  Mouth: MMM  Cardiac: regular rate   Resp: unlabored breathing  GI: Abd s/nt/nd  Neuro: grossly normal and intact  Skin: No visible rashes   MSK: mild ttp R SI joint, no midline tenderness, R thigh compartment soft, no saddle anesthesia, normal ROM of knee, no signs of septic joint Constitutional: NAD AAOx3  Eyes: PERRLA EOMI  Head: Normocephalic atraumatic  Mouth: MMM  Cardiac: regular rate   Resp: unlabored breathing clear b/l   GI: Abd s/nt/nd  Neuro: grossly normal and intact  Skin: No visible rashes   MSK: mild ttp R SI joint, no midline tenderness, R thigh compartment soft, no saddle anesthesia, normal ROM of knee, no signs of septic joint

## 2024-03-12 NOTE — ED STATDOCS - PATIENT PORTAL LINK FT
You can access the FollowMyHealth Patient Portal offered by [facility] by registering at the following website: [email or web address]. By joining [email or web address]’s FollowMyHealth portal, you will also be able to view your health information using other applications (apps) compatible with our system.

## 2024-03-12 NOTE — ED STATDOCS - CLINICAL SUMMARY MEDICAL DECISION MAKING FREE TEXT BOX
59-year-old female presents the emergency department with right leg pain.  Symptoms started today.  No falls or trauma.  Went to her PMD where they did a duplex of the right lower extremity which was negative x-ray of the knee which was negative for fracture patient was unable to walk so she was brought in for evaluation.  Patient lives in assisted living and is here with an aide.  No fevers no vomiting exam with mild tenderness to palpation of the right hip with no midline spinal tenderness mild tenderness at the right SI joint positive straight leg raise on right no saddle anesthesia no foot drop no signs of compartment syndrome no signs of septic joints extensor mechanism intact.  Likely sciatica will symptom control will not need to repeat x-ray or ultrasound as she just had a today we reviewed the results on patient's phone.  Will assess need for admission for rehab.

## 2024-03-12 NOTE — ED STATDOCS - PROGRESS NOTE DETAILS
60 y/o F with PMH of DVT on coumadin presents with worsening right sided leg pain. Pt with daughter at bedside. No reported trauma. Daughter states patient was evaluated at PCP'd office this AM. Was able to ambulate into office, but reports pain was 10/10 upon leaving and required wheelchair assistance. Daughter states patient had XR of right knee which was negative and US of which they do not have results yet. Patient has not taken pain medication since symptoms started. Of note patient lives with boyfriend in facility, was advised to seek emergency care if she is unable to walk. PE: Uncomfortable appearing. HEENT: NC/AT PERRLA, EOMI. Cardiac: s1s2, RRR. lungs: CTAB. Abdomen: NBS x4, soft, nontender. MSK: No obvious deformity to spine or extremities. No midline spinal tenderness. Patient able to demonstrate full ROM in bilateral LE, notes pain with knee extension. patient is able to go from sit to stand without assistance. A/P: Leg pain ?radiculopathy. Plan for analgesia, labs, XR, reassess. - Nick Sharma PA-C Patient ambulatory in ED without assistance, feels well. Imaging reviewed with no actionable findings. Daughter states patient has ortho follow up in 2 days. Will dc home. - Nick Sharma PA-C

## 2024-03-12 NOTE — ED STATDOCS - ATTENDING APP SHARED VISIT CONTRIBUTION OF CARE
I, Thomas Chu MD, personally saw the patient with ACP.  I have personally performed a face to face diagnostic evaluation on this patient.  I have reviewed the ACP note and agree with the history, exam, and plan of care, except as noted. I personally made/approved the management plan and take responsibility for the patient management   The initial assessment was performed by myself and then the patient was handed off to the ACP. The patient was followed and re-evaluated by the ACP. All labs, imaging and procedures were evaluated and performed by the ACP and I was available for consultation if any questions in the patients care came up.   I personally made/approved the management plan and take responsibility for the patient management.

## 2024-03-12 NOTE — ED ADULT TRIAGE NOTE - CHIEF COMPLAINT QUOTE
Pt presents to the ED c/o right leg pain. Pt reports having R leg pain for a few days, reports having an xray done today but was unable to bear weight on leg with 10/10 pain. Denies recent injuries or falls.

## 2024-03-12 NOTE — ED ADULT NURSE NOTE - CINV DISCH MEDS REVIEWED YN
Impression: Open angle with borderline findings, low risk, bilateral: H40.013. Plan: Pt ed on findings. Likely physiologic cupping. Recommend OCT RNFL in 6 months.  IOP WNL Yes

## 2024-07-11 ENCOUNTER — NON-APPOINTMENT (OUTPATIENT)
Age: 59
End: 2024-07-11

## 2024-10-01 ENCOUNTER — EMERGENCY (EMERGENCY)
Facility: HOSPITAL | Age: 59
LOS: 1 days | Discharge: HOME | End: 2024-10-01
Attending: EMERGENCY MEDICINE
Payer: MEDICARE

## 2024-10-01 VITALS
RESPIRATION RATE: 18 BRPM | HEART RATE: 74 BPM | OXYGEN SATURATION: 100 % | DIASTOLIC BLOOD PRESSURE: 70 MMHG | SYSTOLIC BLOOD PRESSURE: 104 MMHG | TEMPERATURE: 97 F

## 2024-10-01 VITALS
DIASTOLIC BLOOD PRESSURE: 77 MMHG | HEART RATE: 103 BPM | RESPIRATION RATE: 18 BRPM | WEIGHT: 181.88 LBS | OXYGEN SATURATION: 97 % | SYSTOLIC BLOOD PRESSURE: 113 MMHG | TEMPERATURE: 98 F | HEIGHT: 65 IN

## 2024-10-01 LAB
ALBUMIN SERPL ELPH-MCNC: 3.9 G/DL — SIGNIFICANT CHANGE UP (ref 3.3–5.2)
ALP SERPL-CCNC: 82 U/L — SIGNIFICANT CHANGE UP (ref 40–120)
ALT FLD-CCNC: 15 U/L — SIGNIFICANT CHANGE UP
ANION GAP SERPL CALC-SCNC: 11 MMOL/L — SIGNIFICANT CHANGE UP (ref 5–17)
APTT BLD: 39.8 SEC — HIGH (ref 24.5–35.6)
AST SERPL-CCNC: 18 U/L — SIGNIFICANT CHANGE UP
BASOPHILS # BLD AUTO: 0.08 K/UL — SIGNIFICANT CHANGE UP (ref 0–0.2)
BASOPHILS NFR BLD AUTO: 0.7 % — SIGNIFICANT CHANGE UP (ref 0–2)
BILIRUB SERPL-MCNC: 0.3 MG/DL — LOW (ref 0.4–2)
BUN SERPL-MCNC: 25.3 MG/DL — HIGH (ref 8–20)
CALCIUM SERPL-MCNC: 10.2 MG/DL — SIGNIFICANT CHANGE UP (ref 8.4–10.5)
CHLORIDE SERPL-SCNC: 102 MMOL/L — SIGNIFICANT CHANGE UP (ref 96–108)
CO2 SERPL-SCNC: 23 MMOL/L — SIGNIFICANT CHANGE UP (ref 22–29)
CREAT SERPL-MCNC: 1.55 MG/DL — HIGH (ref 0.5–1.3)
EGFR: 38 ML/MIN/1.73M2 — LOW
EOSINOPHIL # BLD AUTO: 0.22 K/UL — SIGNIFICANT CHANGE UP (ref 0–0.5)
EOSINOPHIL NFR BLD AUTO: 1.9 % — SIGNIFICANT CHANGE UP (ref 0–6)
FLUAV AG NPH QL: SIGNIFICANT CHANGE UP
FLUBV AG NPH QL: SIGNIFICANT CHANGE UP
GLUCOSE SERPL-MCNC: 103 MG/DL — HIGH (ref 70–99)
HCT VFR BLD CALC: 34.2 % — LOW (ref 34.5–45)
HGB BLD-MCNC: 11.5 G/DL — SIGNIFICANT CHANGE UP (ref 11.5–15.5)
IMM GRANULOCYTES NFR BLD AUTO: 0.5 % — SIGNIFICANT CHANGE UP (ref 0–0.9)
INR BLD: 2.08 RATIO — HIGH (ref 0.85–1.16)
LYMPHOCYTES # BLD AUTO: 1.32 K/UL — SIGNIFICANT CHANGE UP (ref 1–3.3)
LYMPHOCYTES # BLD AUTO: 11.6 % — LOW (ref 13–44)
MCHC RBC-ENTMCNC: 30.3 PG — SIGNIFICANT CHANGE UP (ref 27–34)
MCHC RBC-ENTMCNC: 33.6 GM/DL — SIGNIFICANT CHANGE UP (ref 32–36)
MCV RBC AUTO: 90.2 FL — SIGNIFICANT CHANGE UP (ref 80–100)
MONOCYTES # BLD AUTO: 0.87 K/UL — SIGNIFICANT CHANGE UP (ref 0–0.9)
MONOCYTES NFR BLD AUTO: 7.6 % — SIGNIFICANT CHANGE UP (ref 2–14)
NEUTROPHILS # BLD AUTO: 8.85 K/UL — HIGH (ref 1.8–7.4)
NEUTROPHILS NFR BLD AUTO: 77.7 % — HIGH (ref 43–77)
NT-PROBNP SERPL-SCNC: 204 PG/ML — SIGNIFICANT CHANGE UP (ref 0–300)
PLATELET # BLD AUTO: 433 K/UL — HIGH (ref 150–400)
POTASSIUM SERPL-MCNC: 4.7 MMOL/L — SIGNIFICANT CHANGE UP (ref 3.5–5.3)
POTASSIUM SERPL-SCNC: 4.7 MMOL/L — SIGNIFICANT CHANGE UP (ref 3.5–5.3)
PROT SERPL-MCNC: 7 G/DL — SIGNIFICANT CHANGE UP (ref 6.6–8.7)
PROTHROM AB SERPL-ACNC: 23.9 SEC — HIGH (ref 9.9–13.4)
RBC # BLD: 3.79 M/UL — LOW (ref 3.8–5.2)
RBC # FLD: 13.1 % — SIGNIFICANT CHANGE UP (ref 10.3–14.5)
RSV RNA NPH QL NAA+NON-PROBE: SIGNIFICANT CHANGE UP
SARS-COV-2 RNA SPEC QL NAA+PROBE: SIGNIFICANT CHANGE UP
SODIUM SERPL-SCNC: 136 MMOL/L — SIGNIFICANT CHANGE UP (ref 135–145)
TROPONIN T, HIGH SENSITIVITY RESULT: 12 NG/L — SIGNIFICANT CHANGE UP (ref 0–51)
TROPONIN T, HIGH SENSITIVITY RESULT: 13 NG/L — SIGNIFICANT CHANGE UP (ref 0–51)
WBC # BLD: 11.4 K/UL — HIGH (ref 3.8–10.5)
WBC # FLD AUTO: 11.4 K/UL — HIGH (ref 3.8–10.5)

## 2024-10-01 PROCEDURE — 93010 ELECTROCARDIOGRAM REPORT: CPT

## 2024-10-01 PROCEDURE — 99285 EMERGENCY DEPT VISIT HI MDM: CPT

## 2024-10-01 PROCEDURE — 71275 CT ANGIOGRAPHY CHEST: CPT | Mod: 26,MC

## 2024-10-01 RX ORDER — ACETAMINOPHEN 325 MG
1000 TABLET ORAL ONCE
Refills: 0 | Status: COMPLETED | OUTPATIENT
Start: 2024-10-01 | End: 2024-10-01

## 2024-10-01 RX ORDER — CYCLOBENZAPRINE HCL 10 MG
10 TABLET ORAL ONCE
Refills: 0 | Status: COMPLETED | OUTPATIENT
Start: 2024-10-01 | End: 2024-10-01

## 2024-10-01 RX ORDER — OXYCODONE HYDROCHLORIDE 30 MG/1
1 TABLET, FILM COATED, EXTENDED RELEASE ORAL
Qty: 12 | Refills: 0
Start: 2024-10-01 | End: 2024-10-04

## 2024-10-01 RX ORDER — SODIUM CHLORIDE 0.9 % (FLUSH) 0.9 %
500 SYRINGE (ML) INJECTION ONCE
Refills: 0 | Status: COMPLETED | OUTPATIENT
Start: 2024-10-01 | End: 2024-10-01

## 2024-10-01 RX ORDER — METHYLPREDNISOLONE ACETATE 80 MG/ML
125 INJECTION, SUSPENSION INTRA-ARTICULAR; INTRALESIONAL; INTRAMUSCULAR; SOFT TISSUE ONCE
Refills: 0 | Status: COMPLETED | OUTPATIENT
Start: 2024-10-01 | End: 2024-10-01

## 2024-10-01 RX ORDER — IPRATROPIUM BROMIDE AND ALBUTEROL SULFATE .5; 3 MG/3ML; MG/3ML
3 SOLUTION RESPIRATORY (INHALATION)
Refills: 0 | Status: COMPLETED | OUTPATIENT
Start: 2024-10-01 | End: 2024-10-01

## 2024-10-01 RX ORDER — METHYLPREDNISOLONE ACETATE 80 MG/ML
1 INJECTION, SUSPENSION INTRA-ARTICULAR; INTRALESIONAL; INTRAMUSCULAR; SOFT TISSUE
Qty: 1 | Refills: 0
Start: 2024-10-01 | End: 2024-10-06

## 2024-10-01 RX ADMIN — IPRATROPIUM BROMIDE AND ALBUTEROL SULFATE 3 MILLILITER(S): .5; 3 SOLUTION RESPIRATORY (INHALATION) at 11:26

## 2024-10-01 RX ADMIN — Medication 10 MILLIGRAM(S): at 10:56

## 2024-10-01 RX ADMIN — Medication 500 MILLILITER(S): at 13:59

## 2024-10-01 RX ADMIN — Medication 1000 MILLIGRAM(S): at 11:30

## 2024-10-01 RX ADMIN — IPRATROPIUM BROMIDE AND ALBUTEROL SULFATE 3 MILLILITER(S): .5; 3 SOLUTION RESPIRATORY (INHALATION) at 12:01

## 2024-10-01 RX ADMIN — IPRATROPIUM BROMIDE AND ALBUTEROL SULFATE 3 MILLILITER(S): .5; 3 SOLUTION RESPIRATORY (INHALATION) at 10:57

## 2024-10-01 RX ADMIN — Medication 1000 MILLILITER(S): at 12:53

## 2024-10-01 RX ADMIN — METHYLPREDNISOLONE ACETATE 125 MILLIGRAM(S): 80 INJECTION, SUSPENSION INTRA-ARTICULAR; INTRALESIONAL; INTRAMUSCULAR; SOFT TISSUE at 10:56

## 2024-10-01 RX ADMIN — Medication 400 MILLIGRAM(S): at 10:57

## 2024-10-01 RX ADMIN — Medication 1000 MILLIGRAM(S): at 11:20

## 2024-10-01 NOTE — ED PROVIDER NOTE - PRINCIPAL DIAGNOSIS
Pt presents for final rabies vaccine.  Same administered to left deltoid. Pain aggravated by coughing and deep breathing

## 2024-10-01 NOTE — ED PROVIDER NOTE - CLINICAL SUMMARY MEDICAL DECISION MAKING FREE TEXT BOX
Reassessed.  Doing well status post ED interventions.  Imaging reviewed.  No acute infiltrates or pulmonary emboli.  Cough and wheeze improved with ED interventions.  Incidental note is made of various spinal compression fractures.  According to patient and aide, has had fractures in the past.  Likely owed to prior long-term use of steroids throughout her life.  Patient complaining of pain but no other neurologic deficits.  Symptoms have been ongoing for several months, has not followed up with outpatient spine as yet.  Will send referral today.  Otherwise safe and stable for discharge.  Results discussed/precautions discussed for return    has PCP f/u tomorrow

## 2024-10-01 NOTE — ED PROVIDER NOTE - PATIENT PORTAL LINK FT
No
You can access the FollowMyHealth Patient Portal offered by [facility] by registering at the following website: [email or web address]. By joining [employer]’s FollowMyHealth portal, you will also be able to view your health information using other applications (apps) compatible with our system.

## 2024-10-01 NOTE — ED PROVIDER NOTE - PHYSICAL EXAMINATION
Const: Awake, alert and oriented. In no acute distress. Well appearing.  HEENT: NC/AT. Moist mucous membranes.  Eyes: No scleral icterus. EOMI.  Neck:. Soft and supple. Full ROM without pain.  Cardiac: Regular rate and rhythm. +S1/S2. No murmurs. Peripheral pulses 2+ and symmetric. No LE edema.  Resp: Speaking in full sentences. No evidence of respiratory distress. No wheezes, rales or rhonchi.  Abd: Soft, non-tender, non-distended. Normal bowel sounds in all 4 quadrants. No guarding or rebound.  Back: with mild right paraspinal thoracic region ttp  Neuro: no gross deficits, moving all extremities, normal speech  Skin: No rashes, abrasions or lacerations.

## 2024-10-01 NOTE — ED PROVIDER NOTE - OBJECTIVE STATEMENT
59-year-old female, history of asthma, prior pulmonary embolism, on Coumadin and various inhalers, presents with 3 to 4 days of worsening cough and associated shortness of breath.  Also endorses moderate pain to the right anterolateral ribs, worse with coughing and taking a deep breath.  No associated leg pain.  Endorses compliance with all of her medications.  No fever.

## 2024-10-01 NOTE — ED PROVIDER NOTE - CARE PLAN
Principal Discharge DX:	Pain aggravated by coughing and deep breathing  Secondary Diagnosis:	Asthma flare   1

## 2024-10-01 NOTE — ED PROVIDER NOTE - CARE PROVIDER_API CALL
Cortez Romero  Neurosurgery  [address] [place] [id number]  Phone: [phone]  Fax: [phone]  Follow Up Time: 1-3 Days

## 2024-10-01 NOTE — ED ADULT NURSE NOTE - OBJECTIVE STATEMENT
Patient A&O x 4, respiration even and unlabored, reports to ED  complaining of SOB, Chest pain, Cough, back pain and SOB. As per patient symptoms started last night and got worse this morning. Denies any Nausea, vomiting or dizziness.  No  respiratory distress noted. Safety maintained

## 2024-10-01 NOTE — ED PROVIDER NOTE - NSFOLLOWUPINSTRUCTIONS_ED_ALL_ED_FT
Asthma    Asthma is a condition in which the airways tighten and narrow, making it difficult to breath. Asthma episodes, also called asthma attacks, range from minor to life-threatening. Symptoms include wheezing, coughing, chest tightness, or shortness of breath. The diagnosis of asthma is made by a review of your medical history and a physical exam, but may involve additional testing. Asthma cannot be cured, but medicines and lifestyle changes can help control it. Avoid triggers of asthma which may include animal dander, pollen, mold, smoke, air pollutants, etc.     SEEK IMMEDIATE MEDICAL CARE IF YOU HAVE ANY OF THE FOLLOWING SYMPTOMS: worsening of symptoms, shortness of breath at rest, chest pain, bluish discoloration to lips or fingertips, lightheadedness/dizziness, or fever.      Fracture    A fracture is a break in one of your bones. This can occur from a variety of injuries, especially traumatic ones. Symptoms include pain, bruising, or swelling. Do not use the injured limb. If a fracture is in one of the bones below your waist, do not put weight on that limb unless instructed to do so by your healthcare provider. Crutches or a cane may have been provided. A splint or cast may have been applied by your health care provider. Make sure to keep it dry and follow up with an orthopedist as instructed.    SEEK IMMEDIATE MEDICAL CARE IF YOU HAVE ANY OF THE FOLLOWING SYMPTOMS: numbness, tingling, increasing pain, or weakness in any part of the injured limb.

## 2024-11-20 PROCEDURE — 36415 COLL VENOUS BLD VENIPUNCTURE: CPT

## 2024-11-20 PROCEDURE — 93005 ELECTROCARDIOGRAM TRACING: CPT

## 2024-11-20 PROCEDURE — 83880 ASSAY OF NATRIURETIC PEPTIDE: CPT

## 2024-11-20 PROCEDURE — 84484 ASSAY OF TROPONIN QUANT: CPT

## 2024-11-20 PROCEDURE — 85025 COMPLETE CBC W/AUTO DIFF WBC: CPT

## 2024-11-20 PROCEDURE — 85730 THROMBOPLASTIN TIME PARTIAL: CPT

## 2024-11-20 PROCEDURE — 71275 CT ANGIOGRAPHY CHEST: CPT | Mod: MC

## 2024-11-20 PROCEDURE — 87637 SARSCOV2&INF A&B&RSV AMP PRB: CPT

## 2024-11-20 PROCEDURE — 94640 AIRWAY INHALATION TREATMENT: CPT

## 2024-11-20 PROCEDURE — 99285 EMERGENCY DEPT VISIT HI MDM: CPT | Mod: 25

## 2024-11-20 PROCEDURE — 80053 COMPREHEN METABOLIC PANEL: CPT

## 2024-11-20 PROCEDURE — 85610 PROTHROMBIN TIME: CPT

## 2024-11-20 PROCEDURE — 96361 HYDRATE IV INFUSION ADD-ON: CPT

## 2024-11-20 PROCEDURE — 96375 TX/PRO/DX INJ NEW DRUG ADDON: CPT | Mod: XU

## 2024-11-20 PROCEDURE — 96365 THER/PROPH/DIAG IV INF INIT: CPT | Mod: XU

## 2024-11-22 ENCOUNTER — EMERGENCY (EMERGENCY)
Facility: HOSPITAL | Age: 59
LOS: 1 days | Discharge: HOME | End: 2024-11-22
Attending: STUDENT IN AN ORGANIZED HEALTH CARE EDUCATION/TRAINING PROGRAM
Payer: MEDICARE

## 2024-11-22 VITALS
OXYGEN SATURATION: 96 % | SYSTOLIC BLOOD PRESSURE: 101 MMHG | DIASTOLIC BLOOD PRESSURE: 72 MMHG | RESPIRATION RATE: 18 BRPM | TEMPERATURE: 98 F | HEIGHT: 65 IN | HEART RATE: 84 BPM | WEIGHT: 190.04 LBS

## 2024-11-22 VITALS
HEART RATE: 70 BPM | DIASTOLIC BLOOD PRESSURE: 74 MMHG | RESPIRATION RATE: 18 BRPM | OXYGEN SATURATION: 98 % | SYSTOLIC BLOOD PRESSURE: 113 MMHG | TEMPERATURE: 98 F

## 2024-11-22 LAB
ALBUMIN SERPL ELPH-MCNC: 3.8 G/DL — SIGNIFICANT CHANGE UP (ref 3.3–5.2)
ALP SERPL-CCNC: 67 U/L — SIGNIFICANT CHANGE UP (ref 40–120)
ALT FLD-CCNC: 14 U/L — SIGNIFICANT CHANGE UP
ANION GAP SERPL CALC-SCNC: 12 MMOL/L — SIGNIFICANT CHANGE UP (ref 5–17)
APTT BLD: 35.4 SEC — SIGNIFICANT CHANGE UP (ref 24.5–35.6)
AST SERPL-CCNC: 20 U/L — SIGNIFICANT CHANGE UP
BASOPHILS # BLD AUTO: 0.07 K/UL — SIGNIFICANT CHANGE UP (ref 0–0.2)
BASOPHILS NFR BLD AUTO: 0.5 % — SIGNIFICANT CHANGE UP (ref 0–2)
BILIRUB SERPL-MCNC: 0.2 MG/DL — LOW (ref 0.4–2)
BUN SERPL-MCNC: 19.1 MG/DL — SIGNIFICANT CHANGE UP (ref 8–20)
CALCIUM SERPL-MCNC: 8.1 MG/DL — LOW (ref 8.4–10.5)
CHLORIDE SERPL-SCNC: 104 MMOL/L — SIGNIFICANT CHANGE UP (ref 96–108)
CO2 SERPL-SCNC: 22 MMOL/L — SIGNIFICANT CHANGE UP (ref 22–29)
CREAT SERPL-MCNC: 0.98 MG/DL — SIGNIFICANT CHANGE UP (ref 0.5–1.3)
EGFR: 66 ML/MIN/1.73M2 — SIGNIFICANT CHANGE UP
EOSINOPHIL # BLD AUTO: 0.15 K/UL — SIGNIFICANT CHANGE UP (ref 0–0.5)
EOSINOPHIL NFR BLD AUTO: 1.1 % — SIGNIFICANT CHANGE UP (ref 0–6)
GLUCOSE SERPL-MCNC: 112 MG/DL — HIGH (ref 70–99)
HCT VFR BLD CALC: 33.4 % — LOW (ref 34.5–45)
HGB BLD-MCNC: 11 G/DL — LOW (ref 11.5–15.5)
IMM GRANULOCYTES NFR BLD AUTO: 1.2 % — HIGH (ref 0–0.9)
INR BLD: 2.15 RATIO — HIGH (ref 0.85–1.16)
LYMPHOCYTES # BLD AUTO: 15.6 % — SIGNIFICANT CHANGE UP (ref 13–44)
LYMPHOCYTES # BLD AUTO: 2.13 K/UL — SIGNIFICANT CHANGE UP (ref 1–3.3)
MCHC RBC-ENTMCNC: 30.5 PG — SIGNIFICANT CHANGE UP (ref 27–34)
MCHC RBC-ENTMCNC: 32.9 G/DL — SIGNIFICANT CHANGE UP (ref 32–36)
MCV RBC AUTO: 92.5 FL — SIGNIFICANT CHANGE UP (ref 80–100)
MONOCYTES # BLD AUTO: 1.01 K/UL — HIGH (ref 0–0.9)
MONOCYTES NFR BLD AUTO: 7.4 % — SIGNIFICANT CHANGE UP (ref 2–14)
NEUTROPHILS # BLD AUTO: 10.13 K/UL — HIGH (ref 1.8–7.4)
NEUTROPHILS NFR BLD AUTO: 74.2 % — SIGNIFICANT CHANGE UP (ref 43–77)
PLATELET # BLD AUTO: 352 K/UL — SIGNIFICANT CHANGE UP (ref 150–400)
POTASSIUM SERPL-MCNC: 4 MMOL/L — SIGNIFICANT CHANGE UP (ref 3.5–5.3)
POTASSIUM SERPL-SCNC: 4 MMOL/L — SIGNIFICANT CHANGE UP (ref 3.5–5.3)
PROT SERPL-MCNC: 6.7 G/DL — SIGNIFICANT CHANGE UP (ref 6.6–8.7)
PROTHROM AB SERPL-ACNC: 24.1 SEC — HIGH (ref 9.9–13.4)
RBC # BLD: 3.61 M/UL — LOW (ref 3.8–5.2)
RBC # FLD: 13.6 % — SIGNIFICANT CHANGE UP (ref 10.3–14.5)
SODIUM SERPL-SCNC: 138 MMOL/L — SIGNIFICANT CHANGE UP (ref 135–145)
WBC # BLD: 13.65 K/UL — HIGH (ref 3.8–10.5)
WBC # FLD AUTO: 13.65 K/UL — HIGH (ref 3.8–10.5)

## 2024-11-22 PROCEDURE — 80053 COMPREHEN METABOLIC PANEL: CPT

## 2024-11-22 PROCEDURE — 72128 CT CHEST SPINE W/O DYE: CPT | Mod: MC

## 2024-11-22 PROCEDURE — 85610 PROTHROMBIN TIME: CPT

## 2024-11-22 PROCEDURE — 36415 COLL VENOUS BLD VENIPUNCTURE: CPT

## 2024-11-22 PROCEDURE — 93005 ELECTROCARDIOGRAM TRACING: CPT

## 2024-11-22 PROCEDURE — 72131 CT LUMBAR SPINE W/O DYE: CPT | Mod: 26,MC

## 2024-11-22 PROCEDURE — 72131 CT LUMBAR SPINE W/O DYE: CPT | Mod: MC

## 2024-11-22 PROCEDURE — 99285 EMERGENCY DEPT VISIT HI MDM: CPT

## 2024-11-22 PROCEDURE — 93010 ELECTROCARDIOGRAM REPORT: CPT

## 2024-11-22 PROCEDURE — 72125 CT NECK SPINE W/O DYE: CPT | Mod: 26,MC

## 2024-11-22 PROCEDURE — 72128 CT CHEST SPINE W/O DYE: CPT | Mod: 26,MC

## 2024-11-22 PROCEDURE — 70450 CT HEAD/BRAIN W/O DYE: CPT | Mod: MC

## 2024-11-22 PROCEDURE — 99285 EMERGENCY DEPT VISIT HI MDM: CPT | Mod: 25

## 2024-11-22 PROCEDURE — 70450 CT HEAD/BRAIN W/O DYE: CPT | Mod: 26,MC

## 2024-11-22 PROCEDURE — 85730 THROMBOPLASTIN TIME PARTIAL: CPT

## 2024-11-22 PROCEDURE — 96374 THER/PROPH/DIAG INJ IV PUSH: CPT

## 2024-11-22 PROCEDURE — 72125 CT NECK SPINE W/O DYE: CPT | Mod: MC

## 2024-11-22 PROCEDURE — 85025 COMPLETE CBC W/AUTO DIFF WBC: CPT

## 2024-11-22 RX ORDER — ACETAMINOPHEN 500 MG
1000 TABLET ORAL ONCE
Refills: 0 | Status: COMPLETED | OUTPATIENT
Start: 2024-11-22 | End: 2024-11-22

## 2024-11-22 RX ORDER — OXYCODONE HYDROCHLORIDE 30 MG/1
5 TABLET ORAL ONCE
Refills: 0 | Status: DISCONTINUED | OUTPATIENT
Start: 2024-11-22 | End: 2024-11-22

## 2024-11-22 RX ORDER — METHOCARBAMOL 500 MG/1
1 TABLET ORAL
Qty: 15 | Refills: 0
Start: 2024-11-22

## 2024-11-22 RX ORDER — LIDOCAINE HYDROCHLORIDE 40 MG/ML
1 SOLUTION TOPICAL
Qty: 2 | Refills: 0
Start: 2024-11-22

## 2024-11-22 RX ORDER — SODIUM CHLORIDE 9 MG/ML
1000 INJECTION, SOLUTION INTRAMUSCULAR; INTRAVENOUS; SUBCUTANEOUS ONCE
Refills: 0 | Status: COMPLETED | OUTPATIENT
Start: 2024-11-22 | End: 2024-11-22

## 2024-11-22 RX ORDER — LIDOCAINE HYDROCHLORIDE 40 MG/ML
1 SOLUTION TOPICAL ONCE
Refills: 0 | Status: COMPLETED | OUTPATIENT
Start: 2024-11-22 | End: 2024-11-22

## 2024-11-22 RX ADMIN — LIDOCAINE HYDROCHLORIDE 1 PATCH: 40 SOLUTION TOPICAL at 03:59

## 2024-11-22 RX ADMIN — SODIUM CHLORIDE 1000 MILLILITER(S): 9 INJECTION, SOLUTION INTRAMUSCULAR; INTRAVENOUS; SUBCUTANEOUS at 01:51

## 2024-11-22 RX ADMIN — OXYCODONE HYDROCHLORIDE 5 MILLIGRAM(S): 30 TABLET ORAL at 04:07

## 2024-11-22 RX ADMIN — Medication 400 MILLIGRAM(S): at 01:51

## 2024-11-22 NOTE — ED ADULT TRIAGE NOTE - CHIEF COMPLAINT QUOTE
pt BIBA for fall at home with head strike on coumadin pt states she felt hot and went to sit down when hit head on dresser. Pt denies LOC MD Pineda notified priority ct called [phone]

## 2024-11-22 NOTE — ED PROVIDER NOTE - OBJECTIVE STATEMENT
59y F w/ hx PE on coumadin, asthma; presents for fall. Pt says was at home tonight when she felt hot and lightheaded and then fell to the ground, hitting head on dresser. Denies LOC. Now complaining of low back pain. Pt has known T9 fracture seen on recent CT scan. Says she wears a back brace at home and is supposed to f/u with her spine doctor in 2 weeks. Denies chest pain, SOB, palpitations, numbness, weakness, bowel/bladder dysfunction.

## 2024-11-22 NOTE — ED PROVIDER NOTE - NSFOLLOWUPINSTRUCTIONS_ED_ALL_ED_FT
Lumbar Spine Fracture  Back view of a person showing the lumbar spine and pelvis with a close-up of a fracture in the lumbar spine.  A lumbar spine fracture is a break in one of the bones of the lower back. Lumbar spine fractures can vary from mild to severe. The most severe types are those that:  Cause the broken bones to move out of place (unstable).  Injure or press on the spinal cord.  Have multiple breaks in the bones and damage to nearby tissues (complex).  During recovery, it is normal to have pain and stiffness in the lower back for several weeks.    What are the causes?  This condition may be caused by:  A fall.  A car accident.  A gunshot wound.  A hard, direct hit to the back.  What increases the risk?  You are more likely to develop this condition if:  You are in a situation that could result in a fall or other violent injury.  You have a condition that causes weakness in the bones (osteoporosis).  What are the signs or symptoms?  The main symptom of this condition is severe pain in the lower back. If a fracture is complex or severe, there may also be:  An unusual shape or swollen area on the lower back.  Limited ability to move an area of the lower back.  Inability to empty the bladder (urinary retention).  Inability to control when you urinate or have bowel movements (incontinence).  Loss of strength or sensation in the legs, feet, and toes.  Inability to move (paralysis).  How is this diagnosed?  This condition is diagnosed based on:  A physical exam.  Symptoms and what happened just before they developed.  The results of imaging tests, such as an X-ray, CT scan, or MRI.  If your nerves have been damaged, you may also have other tests to find out the extent of the damage.    How is this treated?  Treatment for this condition depends on how severe the injury is. Most fractures can be treated with:  A back brace.  Bed rest and limits on your activity.  Pain medicine.  Physical therapy.  Fractures that are complex, involve multiple bones, or make the spine unstable may require surgery. Surgery is done:  To remove pressure from the nerves or spinal cord.  To stabilize the broken pieces of bone.  Follow these instructions at home:  Medicines    Take over-the-counter and prescription medicines only as told by your health care provider.  Ask your health care provider if the medicine prescribed to you:  Requires you to avoid driving or using machinery.  Can cause constipation. You may need to take these actions to prevent or treat constipation:  Drink enough fluid to keep your urine pale yellow.  Take over-the-counter or prescription medicines.  Eat foods that are high in fiber, such as beans, whole grains, and fresh fruits and vegetables.  Limit foods that are high in fat and processed sugars, such as fried or sweet foods.  If you have a back brace:    Wear the back brace as told by your health care provider. Remove it only as told by your health care provider.  Check the skin around the brace every day. Tell your health care provider about any concerns.  Keep the brace clean.  If the brace is not waterproof:  Do not let it get wet.  Cover it with a watertight covering when you take a bath or a shower.  Ask your health care provider when it is safe to drive.  Activity    Rest as told by your health care provider.  Do exercises as told by your health care provider.  Return to your normal activities as told by your health care provider. Ask your health care provider what activities are safe for you.  Managing pain, stiffness, and swelling    Bag of ice on a towel on the skin.  If directed, put ice on the injured area. To do this:  If you have a removable brace, remove it only as told by your health care provider.  Put ice in a plastic bag.  Place a towel between your skin and the bag.  Leave the ice on for 20 minutes, 2–3 times a day.  If your skin turns bright red, remove the ice right away to prevent skin damage. The risk of skin damage is higher if you cannot feel pain, heat, or cold.  General instructions    Do not use any products that contain nicotine or tobacco. These products include cigarettes, chewing tobacco, and vaping devices, such e-cigarettes. These can delay bone healing. If you need help quitting, ask your health care provider.  Do not drink alcohol.  Keep all follow-up visits. Failing to follow up as recommended could result in permanent injury, disability, or long-lasting (chronic) pain.  Where to find more information  [employer]: orthoinfo.aaos.org  Contact a health care provider if:  You have a fever.  Your pain medicine is not helping.  Your pain does not get better over time.  You cannot return to your normal activities as planned or expected.  Get help right away if:  You have difficulty breathing.  Your pain is very bad and it suddenly gets worse.  You have numbness, tingling, or weakness in any part of your body.  You are unable to empty your bladder.  You cannot control when you urinate or have bowel movements.  You are unable to move any body part that is below the level of your injury.  You have pain in your abdomen or uncontrolled vomiting.  You have a warm, tender swelling in your leg.  These symptoms may be an emergency. Get help right away. Call 911.  Do not wait to see if the symptoms will go away.  Do not drive yourself to the hospital.  Summary  A lumbar spine fracture is a break in one of the bones of the lower back.  The main symptom of this condition is severe pain in the lower back. If a fracture is complex or severe, there may also be numbness, tingling, or paralysis in the legs.  Most fractures can be treated with a back brace, pain medicine, bed rest and limits on activity, and physical therapy.  Fractures that are complex, involve multiple bones, or make the spine unstable may require surgery.  This information is not intended to replace advice given to you by your health care provider. Make sure you discuss any questions you have with your health care provider. Lumbar Spine Fracture    A lumbar spine fracture is a break in one of the bones of the lower back. Lumbar spine fractures can vary from mild to severe. The most severe types are those that:  Cause the broken bones to move out of place (unstable).  Injure or press on the spinal cord.  Have multiple breaks in the bones and damage to nearby tissues (complex).  During recovery, it is normal to have pain and stiffness in the lower back for several weeks.    What are the causes?  This condition may be caused by:  A fall.  A car accident.  A gunshot wound.  A hard, direct hit to the back.  What increases the risk?  You are more likely to develop this condition if:  You are in a situation that could result in a fall or other violent injury.  You have a condition that causes weakness in the bones (osteoporosis).  What are the signs or symptoms?  The main symptom of this condition is severe pain in the lower back. If a fracture is complex or severe, there may also be:  An unusual shape or swollen area on the lower back.  Limited ability to move an area of the lower back.  Inability to empty the bladder (urinary retention).  Inability to control when you urinate or have bowel movements (incontinence).  Loss of strength or sensation in the legs, feet, and toes.  Inability to move (paralysis).  How is this diagnosed?  This condition is diagnosed based on:  A physical exam.  Symptoms and what happened just before they developed.  The results of imaging tests, such as an X-ray, CT scan, or MRI.  If your nerves have been damaged, you may also have other tests to find out the extent of the damage.    How is this treated?  Treatment for this condition depends on how severe the injury is. Most fractures can be treated with:  A back brace.  Bed rest and limits on your activity.  Pain medicine.  Physical therapy.  Fractures that are complex, involve multiple bones, or make the spine unstable may require surgery. Surgery is done:  To remove pressure from the nerves or spinal cord.  To stabilize the broken pieces of bone.  Follow these instructions at home:  Medicines    Take over-the-counter and prescription medicines only as told by your health care provider.  Ask your health care provider if the medicine prescribed to you:  Requires you to avoid driving or using machinery.  Can cause constipation. You may need to take these actions to prevent or treat constipation:  Drink enough fluid to keep your urine pale yellow.  Take over-the-counter or prescription medicines.  Eat foods that are high in fiber, such as beans, whole grains, and fresh fruits and vegetables.  Limit foods that are high in fat and processed sugars, such as fried or sweet foods.  If you have a back brace:    Wear the back brace as told by your health care provider. Remove it only as told by your health care provider.  Check the skin around the brace every day. Tell your health care provider about any concerns.  Keep the brace clean.  If the brace is not waterproof:  Do not let it get wet.  Cover it with a watertight covering when you take a bath or a shower.  Ask your health care provider when it is safe to drive.  Activity    Rest as told by your health care provider.  Do exercises as told by your health care provider.  Return to your normal activities as told by your health care provider. Ask your health care provider what activities are safe for you.  Managing pain, stiffness, and swelling    Bag of ice on a towel on the skin.  If directed, put ice on the injured area. To do this:  If you have a removable brace, remove it only as told by your health care provider.  Put ice in a plastic bag.  Place a towel between your skin and the bag.  Leave the ice on for 20 minutes, 2–3 times a day.  If your skin turns bright red, remove the ice right away to prevent skin damage. The risk of skin damage is higher if you cannot feel pain, heat, or cold.  General instructions    Do not use any products that contain nicotine or tobacco. These products include cigarettes, chewing tobacco, and vaping devices, such e-cigarettes. These can delay bone healing. If you need help quitting, ask your health care provider.  Do not drink alcohol.  Keep all follow-up visits. Failing to follow up as recommended could result in permanent injury, disability, or long-lasting (chronic) pain.  Where to find more information  [employer]geons: orthoinfo.aaos.org  Contact a health care provider if:  You have a fever.  Your pain medicine is not helping.  Your pain does not get better over time.  You cannot return to your normal activities as planned or expected.  Get help right away if:  You have difficulty breathing.  Your pain is very bad and it suddenly gets worse.  You have numbness, tingling, or weakness in any part of your body.  You are unable to empty your bladder.  You cannot control when you urinate or have bowel movements.  You are unable to move any body part that is below the level of your injury.  You have pain in your abdomen or uncontrolled vomiting.  You have a warm, tender swelling in your leg.  These symptoms may be an emergency. Get help right away. Call 911.  Do not wait to see if the symptoms will go away.  Do not drive yourself to the hospital.  Summary  A lumbar spine fracture is a break in one of the bones of the lower back.  The main symptom of this condition is severe pain in the lower back. If a fracture is complex or severe, there may also be numbness, tingling, or paralysis in the legs.  Most fractures can be treated with a back brace, pain medicine, bed rest and limits on activity, and physical therapy.  Fractures that are complex, involve multiple bones, or make the spine unstable may require surgery.  This information is not intended to replace advice given to you by your health care provider. Make sure you discuss any questions you have with your health care provider.

## 2024-11-22 NOTE — ED ADULT NURSE NOTE - OBJECTIVE STATEMENT
pt A & ox4 c/o back pan. Respirations even and unlabored. Denies chest pain or SOB. Pt reports she felt hot and lightheaded and felt like she was going to fall so she slid to floor where she hit her head. Pt denies LOC. pt reports previous sipnal fracture requiring brace. IV established. Blood specimens sent to lab. Medications administered as ordered. CM and  ongoing.

## 2024-11-22 NOTE — ED PROVIDER NOTE - PHYSICAL EXAMINATION
Constitutional: Awake, alert, in no acute distress  Eyes: no scleral icterus  HENT: normocephalic, atraumatic, moist oral mucosa  Neck: supple  CV: RRR, no murmur  Pulm: non-labored respirations, CTAB  Abdomen: soft, non-tender, non-distended  Back: +tenderness to mid lumbar area, no palpable stepoff  Extremities: no edema, no deformity  Skin: no rash, no jaundice  Neuro: AAOx3, moving all extremities equally, no focal neuro deficits

## 2024-11-22 NOTE — ED PROVIDER NOTE - PATIENT PORTAL LINK FT
You can access the FollowMyHealth Patient Portal offered by [facility] by registering at the following website: [email or web address]. By joining [employer]’s FollowMyHealth portal, you will also be able to view your health information using other applications (apps) compatible with our system.

## 2024-11-22 NOTE — ED PROVIDER NOTE - CLINICAL SUMMARY MEDICAL DECISION MAKING FREE TEXT BOX
59y F w/ hx PE on Eliquis presents for back pain after fall -- likely 2/2 vasovagal episode. CT showing new L1 fracture. No other acute findings on workup. EKG showing NSR, no ischemia. Pt already has back brace at home due to prior T9 fracture; has been following with spine doctor as outpatient. On reassessment, pt feels better. I offered observation for MRI and spine consult; however pt prefers to be discharged at this time. Given strict return precautions. Stable for discharge.

## 2024-11-22 NOTE — ED PROVIDER NOTE - NS ED ROS FT
Constitutional: no fever  CV: no chest pain  Resp: no cough, no shortness of breath  GI: no abdominal pain, no vomiting, no diarrhea  : no dysuria  MSK: +back pain  Neuro: no headache

## 2024-11-22 NOTE — ED ADULT NURSE NOTE - PAIN: PRESENCE, MLM
Physical Therapy Visit    Visit Type: Daily Treatment Note  Visit: 7  Referring Provider: Yanna Langley MD  Medical Diagnosis (from order): Diagnosis Information    Diagnosis  719.46, 338.29 (ICD-9-CM) - M25.569, G89.29 (ICD-10-CM) - Chronic knee pain, unspecified laterality  443.9 (ICD-9-CM) - I73.9 (ICD-10-CM) - Intermittent claudication (CMD)         SUBJECTIVE                                                                                                               Patient is HEP compliant and ready for therapy. He want to go to the gym to ride the bike.       OBJECTIVE                                                                                                                                    Outcome/Assessments  Outcome Measures:   Lower Extremity Functional Scale: LEFS Calculated Total: 43 (0=extreme difficulty; 80=no difficulty) see flowsheet for additional documentation      Treatment     Therapeutic Exercise  Seated LAQ, marching, hip abduction, HS curls with black TB x 20 each way.   TKE x 20 per leg       Manual Therapy   Not done today  Performed in order to improve joint osteokinematics and tissue extensibility for improved mobility and increased independence with ADLs and functional mobility.  - IASTM surrounding B patellae x15 minutes  - Patellar mob medially, grade IV, 30sec 2x each B  - Patellar mob inferiorly, grade IV, 30sec 3x each B  - Knee extension mob: distal femur AP glide, grade III, 30sec 3x each B  - Knee flexion mob: in some flexion, proximal tibia AP glide, grade III, 30sec 3x each B    Therapeutic Activity  All with straight cane.  Toe taps   Mini squats  Calf raises (not done today)    Gait Training  Curb training with hand hold assist and AD on 3 inch curb.     Skilled input: verbal instruction/cues, tactile instruction/cues and as detailed above    Writer verbally educated and received verbal consent for hand placement, positioning of patient, and techniques to be  performed today from patient for therapist position for techniques and hand placement and palpation for techniques as described above and how they are pertinent to the patient's plan of care.    Home Exercise Program    Seated carlos a toussaint LAQ TKE  Sit to stand  Lateral walks with red TBand      ASSESSMENT                                                                                                          To date the patient has made gains as expected.  Patient continues to have impairments and functional deficits as noted.  Patient will continue to benefit from skilled care as outlined.    Patient has demonstrated improvement in ambulating 3 inch curb not needing hand hold assist but still need CGA.  Curl training was very antalgic and could not tolerate very many reps. Stair negotiation mechanics were improved with minor verba cuing to increase knee flexion. Patient requires skilled PT to resolve functional deficits.   Education:   - Results of above outlined education: Verbalizes understanding and Demonstrates understanding    PLAN                                                                                                                          Continue interventions established at initial evalution.    Suggestions for next session as indicated: Progress per plan of care: curb navigation.       Goals  Long Term Goals: to be met by end of plan of care  1. Perform step up on curb to support community ambulation and gait with proper gait mechanics with  AD.(modfied) Status: progressing/ongoing  2. Improve Tug and 5 x STS times by 10 seconds or more to support community ambulation.   Status: progressing/ongoing5  STS  23 sec   Tug 30 sec (with AD)      3. Improve Oswestry by 10 pts or more support functional mobility.  Status: progressing/ongoing  4. Independent with HEP.   Status: met       Therapy procedure time and total treatment time can be found documented on the Time Entry flowsheet     complains of pain/discomfort

## 2024-11-22 NOTE — ED ADULT TRIAGE NOTE - PATIENT ON (OXYGEN DELIVERY METHOD)
room air Sarecycline Counseling: Patient advised regarding possible photosensitivity and discoloration of the teeth, skin, lips, tongue and gums.  Patient instructed to avoid sunlight, if possible.  When exposed to sunlight, patients should wear protective clothing, sunglasses, and sunscreen.  The patient was instructed to call the office immediately if the following severe adverse effects occur:  hearing changes, easy bruising/bleeding, severe headache, or vision changes.  The patient verbalized understanding of the proper use and possible adverse effects of sarecycline.  All of the patient's questions and concerns were addressed.

## 2024-11-22 NOTE — ED ADULT TRIAGE NOTE - BIRTH SEX
Asthma Action Plan for Student Name: Katherin Miranda   Your child should have regularly scheduled asthma check ups and should be seen after any emergency room or hospital visit by their pulmonary provider.  Other important instructions:  1. No smoking in your home or car, even if your child is not present.  2. Always use a spacer with inhalers (MDIs) and rinse your child's mouth out after using inhaled       steroids.  3. Take measures to remove or control known triggers in your child's environment.       Your child's triggers are:      [x] Respiratory infections or flu         [] Mold                                 [x] Pollen      [] Weather/temperature changes    [] Indoor pets                       [x] Exercise      [] Indoor/outdoor pollution               [] Household          [] Strong emotion      [] Dust, dust mites                           [] Strong odors or sprays    [] Cockroaches      [] Other allergies    4. It is the responsibility of the parent/guardian to provide medication.  GREEN ZONE- ALL CLEAR- GO                              USE CONTROLLER MEDICINES    You are OK   ?                        []No controller medicine needed at this time   You should NOT have:  Wheezing  Coughing  Chest tightness  Waking up at night due to Asthma  Problems at play due to Asthma  Medicine       Method    How Much       How Often  Budesonide 0.25mg twice per day via nebulizer  Claritin or zyrtec 5mg daily as needed for cough         15 minutes before exercise use 2-4 puffs (Inhaled)  YELLOW ZONE - CAUTION!                       TAKE ACTION TAKE QUICK RELIEF MEDICINE    Asthma Getting Worse                             Continue to use daily green zone medicines and add:   You may have:  Coughing  Wheezing  Chest tightness  Fist signs of a cold  Cough at night  Medicine       Method    How Much       How Often  2-4 puffs of albuterol with spacer and mask OR 1 nebulizer treatment every 4 hours as needed for 
cough    4 puffs = 1 nebulizer  If you don't use the albuterol for 7 days, waste 2 puffs prior to using   If yellow zone symptoms continue for 24 hours, or they require extra rescue medication more than         2x per week, call your child's pulmonology provider for further instructions.                                 RED ZONE - STOP! - GET HELP NOW!                     TAKE QUICK RELIEF MEDICINE      This is an emergency!                  Continue to use green zone medicines and do the following:       You may have:  Quick relief medicine not helping  Wheezing that is worse   Faster breathing  Blue lips or nail beds  Trouble walking or talking   Chest and neck pulled in with each breath Use 4 puffs or 1 vial Albuterol/Xopenex        Inhaled every 20 minutes for a total of        12 puffs or 3 nebs dose  Call the doctor now         for further instructions. If you cannot contact         the doctor go directly to the EMERGENCY         ROOM or call 911. DO NOT WAIT!!!   Student may use rescue medication (albuterol) at school.  School Permission Slip Date: _______________________  Physician signature: Natalie Donnelly D.O.  Date: 8/15/2023   Signature of Parent/Responsible Party:_____________________________Date:_______________    
Female
biba c/o Left nosebleed while at Physical Therapy , bleeding now controlled . no trauma/injury

## 2024-11-22 NOTE — ED ADULT NURSE NOTE - NSSEPSISSUSPECTED_ED_A_ED
Moises MARAVILLA MD PGY1:   PHYSICAL EXAM:    GENERAL: Uncomfortable  HEENT:  Atraumatic, Normocephalic  CHEST/LUNG: Chest rise equal bilaterally  HEART: Regular rate and rhythm  ABDOMEN: Diffusely TTP.   EXTREMITIES:  2+ Peripheral Pulses.  PSYCH: A&Ox3  SKIN: No obvious rashes or lesions No

## 2024-11-22 NOTE — ED PROVIDER NOTE - WET READ LAUNCH FT
Please return to the emergency department immediately if your child is having worsening bleeding, swelling, or pain in his mouth.  Please has a soft diet and not need to bite down on anything until his follow-up with a pediatric dentist.  Please follow-up with pediatric dentist of your choice or 1 from the list provided to you.   There are no Wet Read(s) to document.

## 2025-05-16 ENCOUNTER — INPATIENT (INPATIENT)
Facility: HOSPITAL | Age: 60
LOS: 2 days | Discharge: HOME | DRG: 684 | End: 2025-05-19
Attending: STUDENT IN AN ORGANIZED HEALTH CARE EDUCATION/TRAINING PROGRAM | Admitting: INTERNAL MEDICINE
Payer: MEDICARE

## 2025-05-16 VITALS
TEMPERATURE: 98 F | WEIGHT: 169.98 LBS | OXYGEN SATURATION: 98 % | DIASTOLIC BLOOD PRESSURE: 47 MMHG | HEART RATE: 82 BPM | RESPIRATION RATE: 18 BRPM | SYSTOLIC BLOOD PRESSURE: 87 MMHG

## 2025-05-16 LAB
ALBUMIN SERPL ELPH-MCNC: 4.4 G/DL — SIGNIFICANT CHANGE UP (ref 3.3–5.2)
ALP SERPL-CCNC: 82 U/L — SIGNIFICANT CHANGE UP (ref 40–120)
ALT FLD-CCNC: 18 U/L — SIGNIFICANT CHANGE UP
ANION GAP SERPL CALC-SCNC: 18 MMOL/L — HIGH (ref 5–17)
ANISOCYTOSIS BLD QL: SLIGHT — SIGNIFICANT CHANGE UP
APTT BLD: 41.1 SEC — HIGH (ref 26.1–36.8)
AST SERPL-CCNC: 20 U/L — SIGNIFICANT CHANGE UP
BASOPHILS # BLD AUTO: 0.08 K/UL — SIGNIFICANT CHANGE UP (ref 0–0.2)
BASOPHILS # BLD MANUAL: 0.39 K/UL — HIGH (ref 0–0.2)
BASOPHILS NFR BLD AUTO: 0.3 % — SIGNIFICANT CHANGE UP (ref 0–2)
BASOPHILS NFR BLD MANUAL: 1.7 % — SIGNIFICANT CHANGE UP (ref 0–2)
BILIRUB SERPL-MCNC: <0.2 MG/DL — LOW (ref 0.4–2)
BLD GP AB SCN SERPL QL: SIGNIFICANT CHANGE UP
BUN SERPL-MCNC: 26.7 MG/DL — HIGH (ref 8–20)
CALCIUM SERPL-MCNC: 9.4 MG/DL — SIGNIFICANT CHANGE UP (ref 8.4–10.5)
CHLORIDE SERPL-SCNC: 97 MMOL/L — SIGNIFICANT CHANGE UP (ref 96–108)
CO2 SERPL-SCNC: 23 MMOL/L — SIGNIFICANT CHANGE UP (ref 22–29)
CREAT SERPL-MCNC: 1.79 MG/DL — HIGH (ref 0.5–1.3)
EGFR: 32 ML/MIN/1.73M2 — LOW
EGFR: 32 ML/MIN/1.73M2 — LOW
EOSINOPHIL # BLD AUTO: 0.15 K/UL — SIGNIFICANT CHANGE UP (ref 0–0.5)
EOSINOPHIL # BLD MANUAL: 0 K/UL — SIGNIFICANT CHANGE UP (ref 0–0.5)
EOSINOPHIL NFR BLD AUTO: 0.7 % — SIGNIFICANT CHANGE UP (ref 0–6)
EOSINOPHIL NFR BLD MANUAL: 0 % — SIGNIFICANT CHANGE UP (ref 0–6)
GLUCOSE SERPL-MCNC: 84 MG/DL — SIGNIFICANT CHANGE UP (ref 70–99)
HCT VFR BLD CALC: 38.2 % — SIGNIFICANT CHANGE UP (ref 34.5–45)
HGB BLD-MCNC: 12.5 G/DL — SIGNIFICANT CHANGE UP (ref 11.5–15.5)
IMM GRANULOCYTES # BLD AUTO: 0.17 K/UL — HIGH (ref 0–0.07)
IMM GRANULOCYTES NFR BLD AUTO: 0.7 % — SIGNIFICANT CHANGE UP (ref 0–0.9)
INR BLD: 2.35 RATIO — HIGH (ref 0.85–1.16)
LYMPHOCYTES # BLD AUTO: 1.68 K/UL — SIGNIFICANT CHANGE UP (ref 1–3.3)
LYMPHOCYTES # BLD MANUAL: 1.78 K/UL — SIGNIFICANT CHANGE UP (ref 1–3.3)
LYMPHOCYTES NFR BLD AUTO: 7.3 % — LOW (ref 13–44)
LYMPHOCYTES NFR BLD MANUAL: 7.8 % — LOW (ref 13–44)
MCHC RBC-ENTMCNC: 30 PG — SIGNIFICANT CHANGE UP (ref 27–34)
MCHC RBC-ENTMCNC: 32.7 G/DL — SIGNIFICANT CHANGE UP (ref 32–36)
MCV RBC AUTO: 91.6 FL — SIGNIFICANT CHANGE UP (ref 80–100)
MONOCYTES # BLD AUTO: 1.66 K/UL — HIGH (ref 0–0.9)
MONOCYTES # BLD MANUAL: 0.98 K/UL — HIGH (ref 0–0.9)
MONOCYTES NFR BLD AUTO: 7.3 % — SIGNIFICANT CHANGE UP (ref 2–14)
MONOCYTES NFR BLD MANUAL: 4.3 % — SIGNIFICANT CHANGE UP (ref 2–14)
NEUTROPHILS # BLD AUTO: 19.12 K/UL — HIGH (ref 1.8–7.4)
NEUTROPHILS # BLD MANUAL: 19.71 K/UL — HIGH (ref 1.8–7.4)
NEUTROPHILS NFR BLD AUTO: 83.7 % — HIGH (ref 43–77)
NEUTROPHILS NFR BLD MANUAL: 86.2 % — HIGH (ref 43–77)
NRBC # BLD AUTO: 0 K/UL — SIGNIFICANT CHANGE UP (ref 0–0)
NRBC # FLD: 0 K/UL — SIGNIFICANT CHANGE UP (ref 0–0)
NRBC BLD AUTO-RTO: 0 /100 WBCS — SIGNIFICANT CHANGE UP (ref 0–0)
PLAT MORPH BLD: NORMAL — SIGNIFICANT CHANGE UP
PLATELET # BLD AUTO: 406 K/UL — HIGH (ref 150–400)
PMV BLD: 10.3 FL — SIGNIFICANT CHANGE UP (ref 7–13)
POLYCHROMASIA BLD QL SMEAR: SLIGHT — SIGNIFICANT CHANGE UP
POTASSIUM SERPL-MCNC: 4.2 MMOL/L — SIGNIFICANT CHANGE UP (ref 3.5–5.3)
POTASSIUM SERPL-SCNC: 4.2 MMOL/L — SIGNIFICANT CHANGE UP (ref 3.5–5.3)
PROT SERPL-MCNC: 7.9 G/DL — SIGNIFICANT CHANGE UP (ref 6.6–8.7)
PROTHROM AB SERPL-ACNC: 26.3 SEC — HIGH (ref 9.9–13.4)
RBC # BLD: 4.17 M/UL — SIGNIFICANT CHANGE UP (ref 3.8–5.2)
RBC # FLD: 13.8 % — SIGNIFICANT CHANGE UP (ref 10.3–14.5)
RBC BLD AUTO: ABNORMAL
SMUDGE CELLS # BLD: PRESENT
SODIUM SERPL-SCNC: 138 MMOL/L — SIGNIFICANT CHANGE UP (ref 135–145)
WBC # BLD: 22.86 K/UL — HIGH (ref 3.8–10.5)
WBC # FLD AUTO: 22.86 K/UL — HIGH (ref 3.8–10.5)

## 2025-05-16 PROCEDURE — 72192 CT PELVIS W/O DYE: CPT | Mod: 26

## 2025-05-16 PROCEDURE — 73502 X-RAY EXAM HIP UNI 2-3 VIEWS: CPT | Mod: 26,LT

## 2025-05-16 PROCEDURE — 71045 X-RAY EXAM CHEST 1 VIEW: CPT | Mod: 26

## 2025-05-16 PROCEDURE — 99285 EMERGENCY DEPT VISIT HI MDM: CPT | Mod: GC

## 2025-05-16 PROCEDURE — 73552 X-RAY EXAM OF FEMUR 2/>: CPT | Mod: 26,LT

## 2025-05-16 PROCEDURE — 70450 CT HEAD/BRAIN W/O DYE: CPT | Mod: 26

## 2025-05-16 PROCEDURE — 72125 CT NECK SPINE W/O DYE: CPT | Mod: 26

## 2025-05-16 RX ORDER — ACETAMINOPHEN 500 MG/5ML
1000 LIQUID (ML) ORAL ONCE
Refills: 0 | Status: COMPLETED | OUTPATIENT
Start: 2025-05-16 | End: 2025-05-16

## 2025-05-16 RX ADMIN — Medication 1000 MILLILITER(S): at 22:56

## 2025-05-16 RX ADMIN — Medication 400 MILLIGRAM(S): at 22:56

## 2025-05-16 NOTE — ED PROVIDER NOTE - ATTENDING CONTRIBUTION TO CARE
Pt is a 59 yo lady w pmhx of HTN, special needs in group home who presents to the ED with fall after group outing to the mall. Pt felt lightheaded, warm, and she fell back wards and might have hit her head. No full loc. No chest pain, no sob.     Ddx: Near syncope/ ro ich/   Plan: Cbc, cmp, troponin, ct head, cspine, xray, reassess

## 2025-05-16 NOTE — ED PROVIDER NOTE - CLINICAL SUMMARY MEDICAL DECISION MAKING FREE TEXT BOX
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 pt was in the mall when she was feeling lightheaded while walking and then tripped over someone else's crutches. falling on buttocks first and then slightly bumped head on the way down. Pt reports of L inner thigh pain, pt had a pelvic fx from a fall 1 month ago, no sx needed. Pt denies fever, chest pain, SOB, abdominal pain, N/V/D.     vs stable, exam shows mild L inner thigh tenderness. able to ambulate.    priority ct head c spine and bony pelvis for fall with headstrike on warfarin. pt is well appearing.     bloodwork shows leukocytosis and mild re, pending ua, rocephin and blood culture done .

## 2025-05-16 NOTE — ED ADULT NURSE NOTE - CHIEF COMPLAINT
[FreeTextEntry1] : CSI WITH MINIMAL RELIEF 6 WEEKS AGO DISCUSSED TKA VS REPEATING EUFLEXXA INJECTIONS. SHE WOULD LIKE TO TRY VISCO AGAIN BEFORE PROCEEDING WITH TKA. WILL REQUEST AUTH FOR L KNEE EUFLEXXA SHE IS OOW PT FOR HER L SPINE PROVIDED  The patient is a 60y Female complaining of fall.

## 2025-05-16 NOTE — ED PROVIDER NOTE - HIV OFFER
Please inform patient that she has an elevated wbc count likely due to current infection.  Her plt count is low however, consistent with Moderate thrombocytopenia. A reason for this maybe her current infection. There is a <1%this maybe from her antibiotics also. However upon review of her previous labs she had clumping of her plts so this may be due to pseudothrombocytopenia (false low plts) also. Either way when I saw her in clinic she did not have any stigmata of bleeding. We can consider repeating CBC in 1-2 weeks and see what's going on also we can do a peripheral smear to get a closer look at the cells. Next steps after this would be referral to Hematology for further investigation. Additionally I discussed with patient her recent xray results during her last visit and if she is not improving as expected we need to get a CT of her chest.
Previously Declined (within the last year)

## 2025-05-16 NOTE — ED ADULT NURSE NOTE - CHIEF COMPLAINT QUOTE
pt presents to ED s/p fall at mall. +head strike. pt on warfarin. pt endorsing pain in upper thigh, pelvis, and lower back. Obs PA called to assess, no priority CT called.

## 2025-05-16 NOTE — ED ADULT NURSE NOTE - PRIMARY CARE PROVIDER
General Daily Progress Note    Admit Date: 1/5/2018    Subjective:     Patient continues to complain of pain but significant improvement noted. Current Facility-Administered Medications   Medication Dose Route Frequency    HYDROmorphone (DILAUDID) injection 1 mg  1 mg IntraVENous Q4H PRN    dextrose 5% and 0.9% NaCl 1,000 mL with potassium chloride 10 mEq infusion   IntraVENous CONTINUOUS    polyethylene glycol (MIRALAX) packet 17 g  17 g Oral DAILY    magnesium hydroxide (MILK OF MAGNESIA) 400 mg/5 mL oral suspension 30 mL  30 mL Oral DAILY PRN    diphenhydrAMINE (BENADRYL) injection 25 mg  25 mg IntraVENous Q3H PRN    mupirocin (BACTROBAN) 2 % ointment   Both Nostrils Q12H    citalopram (CELEXA) tablet 10 mg  10 mg Oral QHS    folic acid (FOLVITE) tablet 1 mg  1 mg Oral DAILY    pantoprazole (PROTONIX) tablet 40 mg  40 mg Oral ACB    valsartan (DIOVAN) tablet 40 mg  40 mg Oral DAILY    acetaminophen (TYLENOL) tablet 650 mg  650 mg Oral Q4H PRN    ondansetron (ZOFRAN) injection 4 mg  4 mg IntraVENous Q4H PRN    heparin (porcine) injection 5,000 Units  5,000 Units SubCUTAneous Q12H        Review of Systems  A comprehensive review of systems was negative.     Objective:     Patient Vitals for the past 24 hrs:   BP Temp Pulse Resp SpO2   01/12/18 0743 142/82 99 °F (37.2 °C) 89 16 95 %   01/12/18 0343 114/75 98.9 °F (37.2 °C) 96 16 98 %   01/11/18 2323 122/74 99 °F (37.2 °C) 96 16 94 %   01/11/18 1921 153/81 98.6 °F (37 °C) 95 16 98 %   01/11/18 1548 (!) 143/92 99.1 °F (37.3 °C) (!) 102 16 97 %   01/11/18 1215 142/86 99 °F (37.2 °C) 90 18 -             Physical Exam:   Visit Vitals    /82 (BP 1 Location: Right arm, BP Patient Position: At rest)    Pulse 89    Temp 99 °F (37.2 °C)    Resp 16    Ht 5' 10\" (1.778 m)    Wt 190 lb 7.6 oz (86.4 kg)    SpO2 95%    BMI 27.33 kg/m2     General appearance: alert, cooperative, no distress, appears stated age  Neck: supple, symmetrical, trachea midline, no adenopathy, thyroid: not enlarged, symmetric, no tenderness/mass/nodules, no carotid bruit and no JVD  Lungs: clear to auscultation bilaterally  Heart: regular rate and rhythm, S1, S2 normal, no murmur, click, rub or gallop  Abdomen: soft, non-tender. Bowel sounds normal. No masses,  no organomegaly  Extremities: extremities normal, atraumatic, no cyanosis or edema        Data Review   Recent Results (from the past 24 hour(s))   CBC WITH MANUAL DIFF    Collection Time: 01/12/18  6:13 AM   Result Value Ref Range    WBC 7.6 3.6 - 11.0 K/uL    RBC 1.91 (L) 3.80 - 5.20 M/uL    HGB 6.1 (L) 11.5 - 16.0 g/dL    HCT 18.0 (L) 35.0 - 47.0 %    MCV 94.2 80.0 - 99.0 FL    MCH 31.9 26.0 - 34.0 PG    MCHC 33.9 30.0 - 36.5 g/dL    RDW 16.4 (H) 11.5 - 14.5 %    PLATELET 670 732 - 122 K/uL    NEUTROPHILS 44 %    BAND NEUTROPHILS 0 %    LYMPHOCYTES 36 %    MONOCYTES 14 %    EOSINOPHILS 5 %    BASOPHILS 1 %    METAMYELOCYTES 0 %    MYELOCYTES 0 %    PROMYELOCYTES 0 %    BLASTS 0 %    OTHER CELL 0      NRBC 3.0  WBC    ABS. NEUTROPHILS 3.3 K/UL    ABS. LYMPHOCYTES 2.7 K/UL    ABS. MONOCYTES 1.1 K/UL    ABS. EOSINOPHILS 0.4 K/UL    ABS. BASOPHILS 0.1 K/UL    DF MANUAL      RBC COMMENTS ANISOCYTOSIS  1+        RBC COMMENTS TARGET CELLS  PRESENT        RBC COMMENTS SICKLE CELLS  PRESENT        NRBC 1.0 (H) 0  WBC    ABSOLUTE NRBC 0.08 (H) 0.00 - 0.01 K/uL    WBC CORRECTED FOR NR ADJUSTED FOR NUCLEATED RBC'S      DIFFERENTIAL MANUAL DIFFERENTIAL ORDERED     RETICULOCYTE COUNT    Collection Time: 01/12/18  6:13 AM   Result Value Ref Range    Reticulocyte count 11.7 (H) 0.7 - 2.1 %           Assessment:     Principal Problem:    Sickle cell pain crisis ([facility] [address] (1/5/2018)    Active Problems:    Venous insufficiency (10/3/2014)        Plan:     1. We will begin to reduce analgesic. Hemoglobin continues to drop but minimally. No transfusions for now. 2.  Will mobilize. md

## 2025-05-16 NOTE — ED ADULT NURSE NOTE - OBJECTIVE STATEMENT
Pt is AxOx3 c/o fall, pt was at mall when she became sweaty and fell, pt denies LOC, witnesses report positive headstrike, positive blood thinner, hx of clots, pt needed help to get off the ground. Breathing even and unlabored. Vitals as charted. David SOB, CP, N/V, headache, blurrd vision. Pt is aware of plan of car.

## 2025-05-16 NOTE — ED PROVIDER NOTE - PROGRESS NOTE DETAILS
pt normotensive, stable. CTAP negative for infection. Discussed findings of luekocytosis and Gricel with pt, and need for admisison. Pt tearfully accepted.

## 2025-05-16 NOTE — ED ADULT TRIAGE NOTE - CHIEF COMPLAINT QUOTE
pt presents to ED [place] fall at mall. +head strike. pt on warfarin. pt endorsing pain in upper thigh, pelvis, and lower back. pt presents to ED s/p fall at mall. +head strike. pt on warfarin. pt endorsing pain in upper thigh, pelvis, and lower back. Obs PA called to assess, no priority CT called.

## 2025-05-16 NOTE — ED PROVIDER NOTE - OBJECTIVE STATEMENT
Pt is a 59 yo female with pmh of HTN, PE/dvt on coumadin, special needs presenting after fall. pt was in the mall when she was feeling lightheaded while walking and then tripped over someone else's crutches. falling on buttocks first and then slightly bumped head on the way down. Pt reports of L inner thigh pain, pt had a pelvic fx from a fall 1 month ago, no sx needed. Pt denies fever, chest pain, SOB, abdominal pain, N/V/D.

## 2025-05-16 NOTE — ED PROVIDER NOTE - PHYSICAL EXAMINATION
General: NAD, well appearing  HEENT: Normocephalic, atraumatic  Neck: No apparent stiffness or JVD  Pulm: Chest wall symmetric and nontender, lungs clear to ascultation   Cardiac: Regular rate and regular rhythm  Abdomen: Nontender and nondistended  Skin: Skin is warm, dry and intact without rashes or lesions.  Neuro: No motor or sensory deficits above reported baseline  MSK: Mild L inner thigh tenderness

## 2025-05-16 NOTE — ED ADULT NURSE NOTE - NSFALLRISKINTERV_ED_ALL_ED

## 2025-05-17 DIAGNOSIS — N17.9 ACUTE KIDNEY FAILURE, UNSPECIFIED: ICD-10-CM

## 2025-05-17 LAB
APPEARANCE UR: CLEAR — SIGNIFICANT CHANGE UP
BACTERIA # UR AUTO: NEGATIVE /HPF — SIGNIFICANT CHANGE UP
BILIRUB UR-MCNC: NEGATIVE — SIGNIFICANT CHANGE UP
CK SERPL-CCNC: 107 U/L — SIGNIFICANT CHANGE UP (ref 25–170)
COLOR SPEC: YELLOW — SIGNIFICANT CHANGE UP
DIFF PNL FLD: NEGATIVE — SIGNIFICANT CHANGE UP
FLUAV AG NPH QL: SIGNIFICANT CHANGE UP
FLUBV AG NPH QL: SIGNIFICANT CHANGE UP
GLUCOSE UR QL: NEGATIVE MG/DL — SIGNIFICANT CHANGE UP
KETONES UR QL: ABNORMAL MG/DL
LEUKOCYTE ESTERASE UR-ACNC: ABNORMAL
NITRITE UR-MCNC: NEGATIVE — SIGNIFICANT CHANGE UP
PH UR: 5.5 — SIGNIFICANT CHANGE UP (ref 5–8)
PROT UR-MCNC: SIGNIFICANT CHANGE UP MG/DL
RBC CASTS # UR COMP ASSIST: 1 /HPF — SIGNIFICANT CHANGE UP (ref 0–4)
RSV RNA NPH QL NAA+NON-PROBE: SIGNIFICANT CHANGE UP
SARS-COV-2 RNA SPEC QL NAA+PROBE: SIGNIFICANT CHANGE UP
SOURCE RESPIRATORY: SIGNIFICANT CHANGE UP
SP GR SPEC: 1.02 — SIGNIFICANT CHANGE UP (ref 1–1.03)
SQUAMOUS # UR AUTO: 2 /HPF — SIGNIFICANT CHANGE UP (ref 0–5)
UROBILINOGEN FLD QL: 1 MG/DL — SIGNIFICANT CHANGE UP (ref 0.2–1)
WBC UR QL: 2 /HPF — SIGNIFICANT CHANGE UP (ref 0–5)

## 2025-05-17 PROCEDURE — 71250 CT THORAX DX C-: CPT | Mod: 26

## 2025-05-17 PROCEDURE — 74176 CT ABD & PELVIS W/O CONTRAST: CPT | Mod: 26

## 2025-05-17 PROCEDURE — 99223 1ST HOSP IP/OBS HIGH 75: CPT

## 2025-05-17 RX ORDER — MAGNESIUM, ALUMINUM HYDROXIDE 200-200 MG
30 TABLET,CHEWABLE ORAL EVERY 4 HOURS
Refills: 0 | Status: DISCONTINUED | OUTPATIENT
Start: 2025-05-17 | End: 2025-05-19

## 2025-05-17 RX ORDER — DILTIAZEM HYDROCHLORIDE 240 MG/1
120 TABLET, EXTENDED RELEASE ORAL DAILY
Refills: 0 | Status: DISCONTINUED | OUTPATIENT
Start: 2025-05-17 | End: 2025-05-19

## 2025-05-17 RX ORDER — MONTELUKAST SODIUM 10 MG/1
1 TABLET ORAL
Refills: 0 | DISCHARGE

## 2025-05-17 RX ORDER — DILTIAZEM HYDROCHLORIDE 240 MG/1
1 TABLET, EXTENDED RELEASE ORAL
Refills: 0 | DISCHARGE

## 2025-05-17 RX ORDER — ONDANSETRON HCL/PF 4 MG/2 ML
4 VIAL (ML) INJECTION EVERY 8 HOURS
Refills: 0 | Status: DISCONTINUED | OUTPATIENT
Start: 2025-05-17 | End: 2025-05-19

## 2025-05-17 RX ORDER — ACETAMINOPHEN 500 MG/5ML
650 LIQUID (ML) ORAL EVERY 6 HOURS
Refills: 0 | Status: DISCONTINUED | OUTPATIENT
Start: 2025-05-17 | End: 2025-05-19

## 2025-05-17 RX ORDER — ACETAMINOPHEN 500 MG/5ML
1000 LIQUID (ML) ORAL ONCE
Refills: 0 | Status: COMPLETED | OUTPATIENT
Start: 2025-05-17 | End: 2025-05-17

## 2025-05-17 RX ORDER — FLUTICASONE FUROATE, UMECLIDINIUM BROMIDE AND VILANTEROL TRIFENATATE 100; 62.5; 25 UG/1; UG/1; UG/1
1 POWDER RESPIRATORY (INHALATION)
Refills: 0 | DISCHARGE

## 2025-05-17 RX ORDER — SODIUM CHLORIDE 9 G/1000ML
1000 INJECTION, SOLUTION INTRAVENOUS
Refills: 0 | Status: DISCONTINUED | OUTPATIENT
Start: 2025-05-17 | End: 2025-05-19

## 2025-05-17 RX ORDER — MONTELUKAST SODIUM 10 MG/1
10 TABLET ORAL DAILY
Refills: 0 | Status: DISCONTINUED | OUTPATIENT
Start: 2025-05-17 | End: 2025-05-19

## 2025-05-17 RX ORDER — CEFTRIAXONE 500 MG/1
1000 INJECTION, POWDER, FOR SOLUTION INTRAMUSCULAR; INTRAVENOUS ONCE
Refills: 0 | Status: COMPLETED | OUTPATIENT
Start: 2025-05-17 | End: 2025-05-17

## 2025-05-17 RX ORDER — SODIUM CHLORIDE 9 G/1000ML
1000 INJECTION, SOLUTION INTRAVENOUS ONCE
Refills: 0 | Status: COMPLETED | OUTPATIENT
Start: 2025-05-17 | End: 2025-05-17

## 2025-05-17 RX ORDER — LEVOTHYROXINE SODIUM 300 MCG
1 TABLET ORAL
Refills: 0 | DISCHARGE

## 2025-05-17 RX ORDER — MELATONIN 5 MG
3 TABLET ORAL AT BEDTIME
Refills: 0 | Status: DISCONTINUED | OUTPATIENT
Start: 2025-05-17 | End: 2025-05-19

## 2025-05-17 RX ORDER — KETOROLAC TROMETHAMINE 30 MG/ML
15 INJECTION, SOLUTION INTRAMUSCULAR; INTRAVENOUS ONCE
Refills: 0 | Status: DISCONTINUED | OUTPATIENT
Start: 2025-05-17 | End: 2025-05-17

## 2025-05-17 RX ORDER — LEVOTHYROXINE SODIUM 300 MCG
50 TABLET ORAL DAILY
Refills: 0 | Status: DISCONTINUED | OUTPATIENT
Start: 2025-05-17 | End: 2025-05-19

## 2025-05-17 RX ORDER — CEFTRIAXONE 500 MG/1
1000 INJECTION, POWDER, FOR SOLUTION INTRAMUSCULAR; INTRAVENOUS EVERY 24 HOURS
Refills: 0 | Status: DISCONTINUED | OUTPATIENT
Start: 2025-05-18 | End: 2025-05-19

## 2025-05-17 RX ORDER — TIOTROPIUM BROMIDE INHALATION SPRAY 3.12 UG/1
2 SPRAY, METERED RESPIRATORY (INHALATION) DAILY
Refills: 0 | Status: DISCONTINUED | OUTPATIENT
Start: 2025-05-17 | End: 2025-05-19

## 2025-05-17 RX ADMIN — Medication 1000 MILLIGRAM(S): at 07:07

## 2025-05-17 RX ADMIN — Medication 400 MILLIGRAM(S): at 05:27

## 2025-05-17 RX ADMIN — SODIUM CHLORIDE 100 MILLILITER(S): 9 INJECTION, SOLUTION INTRAVENOUS at 14:40

## 2025-05-17 RX ADMIN — DILTIAZEM HYDROCHLORIDE 120 MILLIGRAM(S): 240 TABLET, EXTENDED RELEASE ORAL at 14:40

## 2025-05-17 RX ADMIN — CEFTRIAXONE 1000 MILLIGRAM(S): 500 INJECTION, POWDER, FOR SOLUTION INTRAMUSCULAR; INTRAVENOUS at 00:47

## 2025-05-17 RX ADMIN — TIOTROPIUM BROMIDE INHALATION SPRAY 2 PUFF(S): 3.12 SPRAY, METERED RESPIRATORY (INHALATION) at 14:52

## 2025-05-17 RX ADMIN — Medication 650 MILLIGRAM(S): at 22:18

## 2025-05-17 RX ADMIN — SODIUM CHLORIDE 1000 MILLILITER(S): 9 INJECTION, SOLUTION INTRAVENOUS at 05:22

## 2025-05-17 RX ADMIN — MONTELUKAST SODIUM 10 MILLIGRAM(S): 10 TABLET ORAL at 14:50

## 2025-05-17 RX ADMIN — Medication 1 DROP(S): at 14:40

## 2025-05-17 RX ADMIN — Medication 4 MILLIGRAM(S): at 22:19

## 2025-05-17 RX ADMIN — Medication 50 MICROGRAM(S): at 14:40

## 2025-05-17 RX ADMIN — KETOROLAC TROMETHAMINE 15 MILLIGRAM(S): 30 INJECTION, SOLUTION INTRAMUSCULAR; INTRAVENOUS at 04:02

## 2025-05-17 RX ADMIN — Medication 3 MILLIGRAM(S): at 22:19

## 2025-05-17 RX ADMIN — Medication 650 MILLIGRAM(S): at 23:18

## 2025-05-17 NOTE — H&P ADULT - REASON FOR ADMISSION
Change antibiotic to Ceftin 500 mg twice a day for 10 days.  He has a possible allergy to penicillin, and this medication can sometimes react to penicillin sensitive patients.  But based on the culture, there are no other good options.  He should not have a problem with this antibiotic   Leukocytosis, IVETTE

## 2025-05-17 NOTE — H&P ADULT - NSHPLABSRESULTS_GEN_ALL_CORE
CTH/CT Cervical IMPRESSION:  CT HEAD:  No acute intracranial hemorrhage, mass effect, or midline shift.    CT CERVICAL SPINE:  No acute fracture or traumatic subluxation.    CT Pelvis WO IMPRESSION:  1.  No acute fracture or dislocation.  2.  Subacute fracture deformities involving the left superior and   inferior pubic rami. Chronic, healed fracture deformity of the right   superior and inferior pubic rami.  3.  Urinary bladder wall thickening may be due to underdistention and/or   cystitis.    CT Chest/Abd/Pelvis IMPRESSION:  No obvious acute osseous fracture. Mild loss of height at the superior   endplate of the T12 vertebral body is new from 11/24/2024 but appears   chronic in nature, however correlate for site tenderness to determine   acuity.  Subacute, healing fractures of the bilateral superior and   inferior pubic rami. Additional chronic vertebral compression   deformities, as above. Multiple old, healed bilateral rib fractures.  No acute pulmonary pathology.  No acute abdominopelvic pathology.  Other findings, as above.

## 2025-05-17 NOTE — H&P ADULT - ASSESSMENT
ASSESSMENT:  60F from Group Home for special needs, DVT/PE on Warfarin, HTN presents to [facility] ER s/p witnessed fall while in the mall admitted for Leukocytosis r/o UTI and IVETTE.    PLAN:  Leukocytosis r/o UTI  -CT C/A/P negative  -CT Pelvis with Urinary bladder wall thickening may be due to underdistention and/or cystitis  -UA negative  -BCX pending  -RSV/Flu/COVID negative  -Trend WBC 22  -Ceftriaxone 1g q24    Recent Fall  -CTH/CT Cervical Negative  -CT C/A/P with known pubic rami fractures  -Fall Precautions  -Check CPK    IVETTE  -Cr 1.79 with normal baseline  -CT Pelvis and CT ABDPEL negative  -2L IVFB given in ED  -IVF LR 100cc/hr  -Trend BMP  -Check CPK  -Monitor UOP  -Hold Lisinopril/HCTZ 10mg/12.5mg q24    Asthma  -Trelegy Ellipta sub  -Montelukast 10mg q24    DVT/PE, HTN  -Hold ACEI/HCTZ  -Diltiazem 120mg q24  -Warfarin 4mg q24  -Trend INR    Hypothyroidism  -Levothyroxine 50mcg q24    VTE PPX: SCD/Warfarin  DISPO: Pending improvement in leukocytosis and IVETTE plan for dc home with current services.

## 2025-05-17 NOTE — PATIENT PROFILE ADULT - FALL HARM RISK - HARM RISK INTERVENTIONS
Assistance with ambulation/Assistance OOB with selected safe patient handling equipment/Communicate Risk of Fall with Harm to all staff/Monitor gait and stability/Reinforce activity limits and safety measures with patient and family/Sit up slowly, dangle for a short time, stand at bedside before walking/Tailored Fall Risk Interventions/Visual Cue: Yellow wristband and red socks/Bed in lowest position, wheels locked, appropriate side rails in place/Call bell, personal items and telephone in reach/Instruct patient to call for assistance before getting out of bed or chair/Non-slip footwear when patient is out of bed/[place] to call system/Physically safe environment - no spills, clutter or unnecessary equipment/Purposeful Proactive Rounding/Room/bathroom lighting operational, light cord in reach

## 2025-05-17 NOTE — H&P ADULT - NSHPPHYSICALEXAM_GEN_ALL_CORE
Vital Signs Last 24 Hrs  T(C): 36.4 (17 May 2025 08:26), Max: 36.4 (16 May 2025 21:36)  T(F): 97.6 (17 May 2025 08:26), Max: 97.6 (17 May 2025 06:07)  HR: 72 (17 May 2025 08:26) (72 - 82)  BP: 110/74 (17 May 2025 08:26) (87/47 - 129/81)  BP(mean): 74 (16 May 2025 22:55) (74 - 74)  RR: 17 (17 May 2025 08:26) (17 - 18)  SpO2: 97% (17 May 2025 08:26) (97% - 99%)    Parameters below as of 17 May 2025 08:26  Patient On (Oxygen Delivery Method): room air    Constitutional: NAD, VSS  Head: NC/AT  Eyes: PERRL, EOMI, anicteric sclera, conjunctiva WNL  ENT: Normal Pharynx, MMM, No tonsillar exudate/erythema  Neck: Supple, Non-tender  Chest: Non-tender, no rashes  Cardio: RRR, s1/s2, no appreciable murmurs/rubs/gallops  Resp: BS CTA bilaterally, no wheezing/rhonchi/rales  Abd: Soft, Non-tender, Non-distended, no rebound/guarding/rigidity  : not examined  Rectal: not examined  MSK: moving all extremities, no motor weakness, full ROM x4, some R thigh ttp   Ext: palpable distal pulses, good capillary refill  Psych: appropriate, cooperative  Neuro: CN II-XII grossly intact, no focal deficits  Skin: Warm/Dry. No rashes.

## 2025-05-17 NOTE — H&P ADULT - HISTORY OF PRESENT ILLNESS
60F from Group Home for special needs, DVT/PE on Warfarin, HTN, Hypothyroidism presents to [facility] ER s/p witnessed fall while in the mall. Pt reportedly feeling lightheaded while walking then tripped over someone else's crutches falling onto buttocks and then hit her head. Currently on Warfarin for DVT/PE. CTH/CT Cervical negative. Labwork significant for Leukocytosis and IVETTE. CT Pelvis with known pubic rami fractures from prior fall and possible Cystitis. Given Ceftriaxone in ED. Limited historian 2/2 baseline mental status. No F/C. No CP/SOB. Noted to be hypotensive on arrival for which she was given 2L IVFB with improvement. Pt c/o L thigh pain. No other complaints.    ROS limited 2/2 baseline mental status but otherwise negative as above.    PMHX: DVT/PE on Warfarin, HTN, Hypothyroidism  PSHX: Eye Surgery  FamHx: unknown  Social hx: denies etoh

## 2025-05-17 NOTE — ED ADULT NURSE REASSESSMENT NOTE - NS ED NURSE REASSESS COMMENT FT1
Assumed care of pt from mary beth LEYVA at 0300. Pt is resting comfortably in stretcher. NAD. Pt is A&Ox3. NSR on tele. Respirations are even and unlabored on ra. pt updated about on plan of care.

## 2025-05-18 LAB
ALBUMIN SERPL ELPH-MCNC: 3.6 G/DL — SIGNIFICANT CHANGE UP (ref 3.3–5.2)
ALP SERPL-CCNC: 74 U/L — SIGNIFICANT CHANGE UP (ref 40–120)
ALT FLD-CCNC: 17 U/L — SIGNIFICANT CHANGE UP
ANION GAP SERPL CALC-SCNC: 11 MMOL/L — SIGNIFICANT CHANGE UP (ref 5–17)
APTT BLD: 40 SEC — HIGH (ref 26.1–36.8)
AST SERPL-CCNC: 20 U/L — SIGNIFICANT CHANGE UP
BASOPHILS # BLD AUTO: 0.05 K/UL — SIGNIFICANT CHANGE UP (ref 0–0.2)
BASOPHILS NFR BLD AUTO: 0.8 % — SIGNIFICANT CHANGE UP (ref 0–2)
BILIRUB SERPL-MCNC: 0.3 MG/DL — LOW (ref 0.4–2)
BUN SERPL-MCNC: 13.2 MG/DL — SIGNIFICANT CHANGE UP (ref 8–20)
CALCIUM SERPL-MCNC: 9.1 MG/DL — SIGNIFICANT CHANGE UP (ref 8.4–10.5)
CHLORIDE SERPL-SCNC: 104 MMOL/L — SIGNIFICANT CHANGE UP (ref 96–108)
CO2 SERPL-SCNC: 23 MMOL/L — SIGNIFICANT CHANGE UP (ref 22–29)
CREAT SERPL-MCNC: 0.87 MG/DL — SIGNIFICANT CHANGE UP (ref 0.5–1.3)
EGFR: 75 ML/MIN/1.73M2 — SIGNIFICANT CHANGE UP
EGFR: 75 ML/MIN/1.73M2 — SIGNIFICANT CHANGE UP
EOSINOPHIL # BLD AUTO: 0.25 K/UL — SIGNIFICANT CHANGE UP (ref 0–0.5)
EOSINOPHIL NFR BLD AUTO: 4.2 % — SIGNIFICANT CHANGE UP (ref 0–6)
GLUCOSE SERPL-MCNC: 90 MG/DL — SIGNIFICANT CHANGE UP (ref 70–99)
HCT VFR BLD CALC: 33.5 % — LOW (ref 34.5–45)
HGB BLD-MCNC: 11.2 G/DL — LOW (ref 11.5–15.5)
IMM GRANULOCYTES # BLD AUTO: 0.01 K/UL — SIGNIFICANT CHANGE UP (ref 0–0.07)
IMM GRANULOCYTES NFR BLD AUTO: 0.2 % — SIGNIFICANT CHANGE UP (ref 0–0.9)
INR BLD: 2.59 RATIO — HIGH (ref 0.85–1.16)
LYMPHOCYTES # BLD AUTO: 1.42 K/UL — SIGNIFICANT CHANGE UP (ref 1–3.3)
LYMPHOCYTES NFR BLD AUTO: 24.1 % — SIGNIFICANT CHANGE UP (ref 13–44)
MCHC RBC-ENTMCNC: 30.5 PG — SIGNIFICANT CHANGE UP (ref 27–34)
MCHC RBC-ENTMCNC: 33.4 G/DL — SIGNIFICANT CHANGE UP (ref 32–36)
MCV RBC AUTO: 91.3 FL — SIGNIFICANT CHANGE UP (ref 80–100)
MONOCYTES # BLD AUTO: 0.61 K/UL — SIGNIFICANT CHANGE UP (ref 0–0.9)
MONOCYTES NFR BLD AUTO: 10.3 % — SIGNIFICANT CHANGE UP (ref 2–14)
MRSA PCR RESULT.: SIGNIFICANT CHANGE UP
NEUTROPHILS # BLD AUTO: 3.56 K/UL — SIGNIFICANT CHANGE UP (ref 1.8–7.4)
NEUTROPHILS NFR BLD AUTO: 60.4 % — SIGNIFICANT CHANGE UP (ref 43–77)
NRBC # BLD AUTO: 0 K/UL — SIGNIFICANT CHANGE UP (ref 0–0)
NRBC # FLD: 0 K/UL — SIGNIFICANT CHANGE UP (ref 0–0)
NRBC BLD AUTO-RTO: 0 /100 WBCS — SIGNIFICANT CHANGE UP (ref 0–0)
PLATELET # BLD AUTO: 278 K/UL — SIGNIFICANT CHANGE UP (ref 150–400)
PMV BLD: 10.2 FL — SIGNIFICANT CHANGE UP (ref 7–13)
POTASSIUM SERPL-MCNC: 4.6 MMOL/L — SIGNIFICANT CHANGE UP (ref 3.5–5.3)
POTASSIUM SERPL-SCNC: 4.6 MMOL/L — SIGNIFICANT CHANGE UP (ref 3.5–5.3)
PROT SERPL-MCNC: 6.5 G/DL — LOW (ref 6.6–8.7)
PROTHROM AB SERPL-ACNC: 29 SEC — HIGH (ref 9.9–13.4)
RBC # BLD: 3.67 M/UL — LOW (ref 3.8–5.2)
RBC # FLD: 13.8 % — SIGNIFICANT CHANGE UP (ref 10.3–14.5)
S AUREUS DNA NOSE QL NAA+PROBE: DETECTED
SODIUM SERPL-SCNC: 138 MMOL/L — SIGNIFICANT CHANGE UP (ref 135–145)
WBC # BLD: 5.9 K/UL — SIGNIFICANT CHANGE UP (ref 3.8–10.5)
WBC # FLD AUTO: 5.9 K/UL — SIGNIFICANT CHANGE UP (ref 3.8–10.5)

## 2025-05-18 PROCEDURE — 99233 SBSQ HOSP IP/OBS HIGH 50: CPT

## 2025-05-18 RX ADMIN — Medication 650 MILLIGRAM(S): at 14:25

## 2025-05-18 RX ADMIN — Medication 1 DOSE(S): at 11:16

## 2025-05-18 RX ADMIN — Medication 3 MILLIGRAM(S): at 21:12

## 2025-05-18 RX ADMIN — CEFTRIAXONE 1000 MILLIGRAM(S): 500 INJECTION, POWDER, FOR SOLUTION INTRAMUSCULAR; INTRAVENOUS at 05:37

## 2025-05-18 RX ADMIN — Medication 650 MILLIGRAM(S): at 22:13

## 2025-05-18 RX ADMIN — Medication 50 MICROGRAM(S): at 05:37

## 2025-05-18 RX ADMIN — Medication 1 APPLICATION(S): at 11:51

## 2025-05-18 RX ADMIN — Medication 4 MILLIGRAM(S): at 21:13

## 2025-05-18 RX ADMIN — MONTELUKAST SODIUM 10 MILLIGRAM(S): 10 TABLET ORAL at 11:50

## 2025-05-18 RX ADMIN — TIOTROPIUM BROMIDE INHALATION SPRAY 2 PUFF(S): 3.12 SPRAY, METERED RESPIRATORY (INHALATION) at 11:10

## 2025-05-18 RX ADMIN — Medication 1 DROP(S): at 11:50

## 2025-05-18 RX ADMIN — SODIUM CHLORIDE 100 MILLILITER(S): 9 INJECTION, SOLUTION INTRAVENOUS at 17:29

## 2025-05-18 RX ADMIN — DILTIAZEM HYDROCHLORIDE 120 MILLIGRAM(S): 240 TABLET, EXTENDED RELEASE ORAL at 05:37

## 2025-05-18 RX ADMIN — Medication 650 MILLIGRAM(S): at 13:25

## 2025-05-18 RX ADMIN — Medication 1 DOSE(S): at 21:14

## 2025-05-18 RX ADMIN — Medication 650 MILLIGRAM(S): at 21:13

## 2025-05-18 NOTE — PROGRESS NOTE ADULT - SUBJECTIVE AND OBJECTIVE BOX
[facility] Division of Hospital Medicine    INTERVAL HISTORY:  Overnight, no acute events.     Patient seen and examined at bedside this morning. Patient denies chest pain, SOB, abd pain, N/V, fever, chills, dysuria or any other complaints.     MEDICATIONS  (STANDING):  cefTRIAXone Injectable. 1000 milliGRAM(s) IV Push every 24 hours  chlorhexidine 2% Cloths 1 Application(s) Topical <User Schedule>  diltiazem    milliGRAM(s) Oral daily  fluticasone propionate/ salmeterol 100-50 MICROgram(s) Diskus 1 Dose(s) Inhalation two times a day  lactated ringers. 1000 milliLiter(s) (100 mL/Hr) IV Continuous <Continuous>  levothyroxine 50 MICROGram(s) Oral daily  montelukast 10 milliGRAM(s) Oral daily  prednisoLONE acetate 1% Suspension 1 Drop(s) Right EYE daily  tiotropium 2.5 MICROgram(s) Inhaler 2 Puff(s) Inhalation daily    MEDICATIONS  (PRN):  acetaminophen     Tablet .. 650 milliGRAM(s) Oral every 6 hours PRN Temp greater or equal to 38C (100.4F), Mild Pain (1 - 3)  aluminum hydroxide/magnesium hydroxide/simethicone Suspension 30 milliLiter(s) Oral every 4 hours PRN Dyspepsia  melatonin 3 milliGRAM(s) Oral at bedtime PRN Insomnia  ondansetron Injectable 4 milliGRAM(s) IV Push every 8 hours PRN Nausea and/or Vomiting        I&O's Summary    17 May 2025 07:01  -  18 May 2025 07:00  --------------------------------------------------------  IN: 1300 mL / OUT: 0 mL / NET: 1300 mL    18 May 2025 07:01  -  18 May 2025 15:25  --------------------------------------------------------  IN: 580 mL / OUT: 0 mL / NET: 580 mL        PHYSICAL EXAM:  Vital Signs Last 24 Hrs  T(C): 36.4 (18 May 2025 11:23), Max: 36.7 (17 May 2025 20:33)  T(F): 97.5 (18 May 2025 11:23), Max: 98 (17 May 2025 20:33)  HR: 62 (18 May 2025 11:23) (62 - 76)  BP: 117/76 (18 May 2025 11:23) (117/76 - 136/85)  BP(mean): --  RR: 18 (18 May 2025 11:23) (18 - 18)  SpO2: 95% (18 May 2025 11:23) (94% - 98%)    Parameters below as of 18 May 2025 11:23  Patient On (Oxygen Delivery Method): room air          CONSTITUTIONAL: No apparent distress  HEENT: Normocephalic, Atraumatic  RESPIRATORY:  lungs are clear to auscultation bilaterally, No crackles, rhonchi, wheezes  CARDIOVASCULAR: Regular rate and rhythm, no lower extremity edema  ABDOMEN: Soft, non-distended, nontender to palpation, +BS  MUSCLOSKELETAL: moving all 4 extremities spontaneously  PSYCH: thoughts linear, affect appropriate  NEUROLOGY: Alert, Oriented x3    LABS:                        11.2   5.90  )-----------( 278      ( 18 May 2025 06:32 )             33.5     05-18    138  |  104  |  13.2  ----------------------------<  90  4.6   |  23.0  |  0.87    Ca    9.1      18 May 2025 09:05    TPro  6.5[L]  /  Alb  3.6  /  TBili  0.3[L]  /  DBili  x   /  AST  20  /  ALT  17  /  AlkPhos  74  05-18    PT/INR - ( 18 May 2025 06:32 )   PT: 29.0 sec;   INR: 2.59 ratio         PTT - ( 18 May 2025 06:32 )  PTT:40.0 sec      Urinalysis Basic - ( 18 May 2025 09:05 )    Color: x / Appearance: x / SG: x / pH: x  Gluc: 90 mg/dL / Ketone: x  / Bili: x / Urobili: x   Blood: x / Protein: x / Nitrite: x   Leuk Esterase: x / RBC: x / WBC x   Sq Epi: x / Non Sq Epi: x / Bacteria: x        Urinalysis with Rflx Culture (collected 17 May 2025 03:18)    Culture - Blood (collected 17 May 2025 00:40)  Source: Blood Blood  Preliminary Report (18 May 2025 06:01):    No growth at 24 hours      CAPILLARY BLOOD GLUCOSE            RADIOLOGY & ADDITIONAL TESTS:  Results Reviewed

## 2025-05-18 NOTE — PROGRESS NOTE ADULT - ASSESSMENT
60F from Group Home for special needs, DVT/PE on Warfarin, HTN presents to [facility] ER s/p witnessed fall [place] in the mall admitted for Leukocytosis r/o UTI and IVETTE.    Leukocytosis  - CT C/A/P negative  - CT Pelvis with Urinary bladder wall thickening may be due to underdistention and/or cystitis  - white count resolved   - UA negative  - BCX pending  - RSV/Flu/COVID negative  - Trend WBC 22  - will d/c CTX    #Recent Fall  - CTH/CT Cervical Negative  - CT C/A/P with known pubic rami fractures  - Fall Precautions  - Check CPK: 107    #IVETTE  - Cr 1.79 with normal baseline; improved to 0.87  - CT Pelvis and CT ABDPEL negative  - s/p 2L IVFB given in ED  - will d/c LR 100cc/hr  -Hold Lisinopril/HCTZ 10mg/12.5mg q24    #Asthma  - Trelegy Ellipta sub  - Montelukast 10mg q24    #DVT/PE, HTN  - Hold ACEI/HCTZ  - Diltiazem 120mg q24  - Warfarin 4mg q24, dose daily   - Trend INR    #Hypothyroidism  - Levothyroxine 50mcg q24    VTE PPX: SCD/Warfarin  DISPO: d/c tomorrow, pending BCx negative x 48 hours

## 2025-05-19 ENCOUNTER — TRANSCRIPTION ENCOUNTER (OUTPATIENT)
Age: 60
End: 2025-05-19

## 2025-05-19 VITALS
OXYGEN SATURATION: 96 % | RESPIRATION RATE: 18 BRPM | HEART RATE: 77 BPM | SYSTOLIC BLOOD PRESSURE: 128 MMHG | DIASTOLIC BLOOD PRESSURE: 81 MMHG | TEMPERATURE: 98 F

## 2025-05-19 LAB
APTT BLD: 40.9 SEC — HIGH (ref 26.1–36.8)
INR BLD: 2.3 RATIO — HIGH (ref 0.85–1.16)
PROTHROM AB SERPL-ACNC: 26.5 SEC — HIGH (ref 9.9–13.4)

## 2025-05-19 PROCEDURE — 99239 HOSP IP/OBS DSCHRG MGMT >30: CPT

## 2025-05-19 RX ORDER — HYDROCHLOROTHIAZIDE 50 MG/1
1 TABLET ORAL
Refills: 0 | DISCHARGE

## 2025-05-19 RX ORDER — LISINOPRIL 5 MG/1
1 TABLET ORAL
Refills: 0 | DISCHARGE

## 2025-05-19 RX ADMIN — Medication 650 MILLIGRAM(S): at 10:59

## 2025-05-19 RX ADMIN — Medication 1 DROP(S): at 12:38

## 2025-05-19 RX ADMIN — CEFTRIAXONE 1000 MILLIGRAM(S): 500 INJECTION, POWDER, FOR SOLUTION INTRAMUSCULAR; INTRAVENOUS at 05:27

## 2025-05-19 RX ADMIN — Medication 1 DOSE(S): at 09:28

## 2025-05-19 RX ADMIN — Medication 1 APPLICATION(S): at 05:27

## 2025-05-19 RX ADMIN — Medication 650 MILLIGRAM(S): at 11:55

## 2025-05-19 RX ADMIN — MONTELUKAST SODIUM 10 MILLIGRAM(S): 10 TABLET ORAL at 12:38

## 2025-05-19 RX ADMIN — Medication 50 MICROGRAM(S): at 05:27

## 2025-05-19 RX ADMIN — DILTIAZEM HYDROCHLORIDE 120 MILLIGRAM(S): 240 TABLET, EXTENDED RELEASE ORAL at 05:27

## 2025-05-19 RX ADMIN — TIOTROPIUM BROMIDE INHALATION SPRAY 2 PUFF(S): 3.12 SPRAY, METERED RESPIRATORY (INHALATION) at 09:28

## 2025-05-19 NOTE — DISCHARGE NOTE NURSING/CASE MANAGEMENT/SOCIAL WORK - PATIENT PORTAL LINK FT
You can access the FollowMyHealth Patient Portal offered by [facility] by registering at the following website: [email or web address]. By joining [employer]’s FollowMyHealth portal, you will also be able to view your health information using other applications (apps) compatible with our system.

## 2025-05-19 NOTE — DIETITIAN INITIAL EVALUATION ADULT - ADD RECOMMEND
1.) Recommend continue with current prescribed diet, as tolerated.  2.) Monitor diet tolerance & PO intake, weight/hydration status, nutrition-related labs, and skin.

## 2025-05-19 NOTE — DISCHARGE NOTE NURSING/CASE MANAGEMENT/SOCIAL WORK - NSDCPEFALRISK_GEN_ALL_CORE
For information on Fall & Injury Prevention, visit: [email or web address] OR  [email or web address] OR  https://www.cdc.gov/steadi/patient.html

## 2025-05-19 NOTE — DISCHARGE NOTE PROVIDER - NSDCCPCAREPLAN_GEN_ALL_CORE_FT
PRINCIPAL DISCHARGE DIAGNOSIS  Diagnosis: IVETTE (acute kidney injury)  Assessment and Plan of Treatment: likely 2/2 medications and dehydration  resolved with IV fluids   held lisinopril and hctz on dc, continue diltiazem, normotensive during hospitalization  should follow up with PCP if additional antihypertensives are needed      SECONDARY DISCHARGE DIAGNOSES  Diagnosis: Leukocytosis  Assessment and Plan of Treatment: infectious workup negative   blood cultures negative x 48 hours    Diagnosis: Fall  Assessment and Plan of Treatment: - CTH/CT Cervical Negative  - CT C/A/P with known pubic rami fractures  - Fall Precautions      Diagnosis: Asthma  Assessment and Plan of Treatment: - Trelegy Ellipta sub  - Montelukast 10mg q24    Diagnosis: History of DVT in adulthood  Assessment and Plan of Treatment: - hx of PE and HTN  - Hold ACEI/HCTZ  - continue Diltiazem 120mg q24  - Warfarin 4mg q24    Diagnosis: Hypothyroid  Assessment and Plan of Treatment: - Levothyroxine 50mcg q24

## 2025-05-19 NOTE — DISCHARGE NOTE PROVIDER - ATTENDING DISCHARGE PHYSICAL EXAMINATION:
T(C): 36.6 (05-19-25 @ 10:15), Max: 37.1 (05-18-25 @ 16:46)  HR: 94 (05-19-25 @ 10:15) (62 - 94)  BP: 139/85 (05-19-25 @ 10:15) (119/72 - 139/85)  RR: 18 (05-19-25 @ 10:15) (18 - 18)  SpO2: 97% (05-19-25 @ 10:15) (95% - 97%)    CONSTITUTIONAL: Well groomed, no apparent distress  HEENT: NCAT  RESP: No respiratory distress, CTA b/l  CV: RRR,  no peripheral edema  GI: Soft, NT, ND  NEURO: A+O x 3

## 2025-05-19 NOTE — DISCHARGE NOTE NURSING/CASE MANAGEMENT/SOCIAL WORK - FINANCIAL ASSISTANCE
[facility] provides services at a reduced cost to those who are determined to be eligible through [facility]’s financial assistance program. Information regarding [facility]’s financial assistance program can be found by going to [email or web address] or by calling [phone].

## 2025-05-19 NOTE — DISCHARGE NOTE PROVIDER - NSDCMRMEDTOKEN_GEN_ALL_CORE_FT
DilTIAZem (Eqv-Cardizem CD) 120 mg/24 hours oral capsule, extended release: 1 cap(s) orally once a day  levothyroxine 50 mcg (0.05 mg) oral capsule: 1 cap(s) orally once a day  montelukast 10 mg oral tablet: 1 tab(s) orally once a day  prednisoLONE acetate 1% ophthalmic suspension: 1 drop(s) in each affected eye once a day  Trelegy Ellipta 200 mcg-62.5 mcg-25 mcg/inh inhalation powder: 1 inhaled 2 times a day  warfarin 4 mg oral tablet: 1 tab(s) orally once a day

## 2025-05-19 NOTE — DISCHARGE NOTE NURSING/CASE MANAGEMENT/SOCIAL WORK - NSDCPEPTCOWADR_GEN_ALL_CORE
Warfarin/Coumadin increases your risk for bleeding. Notify your doctor if you see any bleeding or any of the side effects listed in the Warfarin/Coumadin Booklet. Diet and medications can affect the PT/INR blood level. When Warfarin/Coumadin is taken with other medicines it can change the way other medicines work. Other medicines can also change the way Warfarin/Coumadin works. It is very important to tell your health care provider about all other medicines, including over-the-counter medications, herbs, diet supplements, or products containing vitamin K. Call your doctor before starting, stopping, or changing the dose of any prescription or over-the-counter medications. Any product containing aspirin lessens the blood's ability to form clots and adds to the effect of Warfarin/Coumadin. Never take aspirin without speaking with your health care provider. Ear Wedge Repair Text: A wedge excision was completed by carrying down an excision through the full thickness of the ear and cartilage with an inward facing Burow's triangle. The wound was then closed in a layered fashion.

## 2025-05-19 NOTE — DIETITIAN INITIAL EVALUATION ADULT - PERTINENT LABORATORY DATA
05-18    138  |  104  |  13.2  ----------------------------<  90  4.6   |  23.0  |  0.87     Alb  3.6

## 2025-05-19 NOTE — DIETITIAN INITIAL EVALUATION ADULT - PERTINENT MEDS FT
MEDICATIONS  (STANDING):  cefTRIAXone Injectable. 1000 milliGRAM(s) IV Push every 24 hours  levothyroxine 50 MICROGram(s) Oral daily  montelukast 10 milliGRAM(s) Oral daily  prednisoLONE acetate 1% Suspension 1 Drop(s) Right EYE daily  MEDICATIONS  (PRN):  aluminum hydroxide/magnesium hydroxide/simethicone Suspension 30 milliLiter(s) Oral every 4 hours PRN Dyspepsia  ondansetron Injectable 4 milliGRAM(s) IV Push every 8 hours PRN Nausea and/or Vomiting

## 2025-05-19 NOTE — DISCHARGE NOTE PROVIDER - HOSPITAL COURSE
60F from [facility] [place] [facility], DVT/PE on Warfarin, HTN presents to [facility] ER s/p [place] admitted for Leukocytosis r/o UTI and IVETTE. Antihypertensives held. U/A negative. IVETTE resolved with IV fluids. White count also normalized. BCx negative x 48 hours. Patient medically stable for discharge with outpatient follow up. 60[facility], DVT/PE on Warfarin, HTN presents to [facility] ER s/p witnessed [place] the [place] admitted for Leukocytosis r/o UTI and IVETTE. Antihypertensives held. U/A negative. IVETTE resolved with IV fluids. White count also normalized. BCx negative x 48 hours. Patient medically stable for discharge with outpatient follow up.     Patient can resume prior vitamins and OTC medications including flonase.

## 2025-05-19 NOTE — DIETITIAN INITIAL EVALUATION ADULT - ORAL INTAKE PTA/DIET HISTORY
Home Pt visited, reports good PO intake. Denies any constipation, diarrhea, vomiting and nausea. Reports UBW of 176 lbs, and hx of 30 lbs weight loss in the last year. Pt has own teeth and denies any problems chewing or swallowing at this time. Pt states " I try to watch my sodium intake'.

## 2025-05-22 LAB
CULTURE RESULTS: SIGNIFICANT CHANGE UP
SPECIMEN SOURCE: SIGNIFICANT CHANGE UP

## 2025-06-02 PROCEDURE — 71250 CT THORAX DX C-: CPT

## 2025-06-02 PROCEDURE — 87040 BLOOD CULTURE FOR BACTERIA: CPT

## 2025-06-02 PROCEDURE — 99285 EMERGENCY DEPT VISIT HI MDM: CPT

## 2025-06-02 PROCEDURE — 80053 COMPREHEN METABOLIC PANEL: CPT

## 2025-06-02 PROCEDURE — 87641 MR-STAPH DNA AMP PROBE: CPT

## 2025-06-02 PROCEDURE — 96374 THER/PROPH/DIAG INJ IV PUSH: CPT

## 2025-06-02 PROCEDURE — 85025 COMPLETE CBC W/AUTO DIFF WBC: CPT

## 2025-06-02 PROCEDURE — 71045 X-RAY EXAM CHEST 1 VIEW: CPT

## 2025-06-02 PROCEDURE — 73502 X-RAY EXAM HIP UNI 2-3 VIEWS: CPT

## 2025-06-02 PROCEDURE — 86850 RBC ANTIBODY SCREEN: CPT

## 2025-06-02 PROCEDURE — 72125 CT NECK SPINE W/O DYE: CPT

## 2025-06-02 PROCEDURE — 82550 ASSAY OF CK (CPK): CPT

## 2025-06-02 PROCEDURE — 72192 CT PELVIS W/O DYE: CPT

## 2025-06-02 PROCEDURE — 87640 STAPH A DNA AMP PROBE: CPT

## 2025-06-02 PROCEDURE — 87637 SARSCOV2&INF A&B&RSV AMP PRB: CPT

## 2025-06-02 PROCEDURE — 36415 COLL VENOUS BLD VENIPUNCTURE: CPT

## 2025-06-02 PROCEDURE — 80048 BASIC METABOLIC PNL TOTAL CA: CPT

## 2025-06-02 PROCEDURE — 74176 CT ABD & PELVIS W/O CONTRAST: CPT

## 2025-06-02 PROCEDURE — 94640 AIRWAY INHALATION TREATMENT: CPT

## 2025-06-02 PROCEDURE — 73552 X-RAY EXAM OF FEMUR 2/>: CPT

## 2025-06-02 PROCEDURE — 96375 TX/PRO/DX INJ NEW DRUG ADDON: CPT

## 2025-06-02 PROCEDURE — 86901 BLOOD TYPING SEROLOGIC RH(D): CPT

## 2025-06-02 PROCEDURE — 86900 BLOOD TYPING SEROLOGIC ABO: CPT

## 2025-06-02 PROCEDURE — 85730 THROMBOPLASTIN TIME PARTIAL: CPT

## 2025-06-02 PROCEDURE — 85610 PROTHROMBIN TIME: CPT

## 2025-06-02 PROCEDURE — 70450 CT HEAD/BRAIN W/O DYE: CPT

## 2025-06-02 PROCEDURE — 81001 URINALYSIS AUTO W/SCOPE: CPT

## 2025-06-02 PROCEDURE — 93005 ELECTROCARDIOGRAM TRACING: CPT
